# Patient Record
Sex: FEMALE | Race: BLACK OR AFRICAN AMERICAN | NOT HISPANIC OR LATINO | Employment: OTHER | URBAN - METROPOLITAN AREA
[De-identification: names, ages, dates, MRNs, and addresses within clinical notes are randomized per-mention and may not be internally consistent; named-entity substitution may affect disease eponyms.]

---

## 2017-05-04 ENCOUNTER — HOSPITAL ENCOUNTER (OUTPATIENT)
Dept: GENERAL RADIOLOGY | Facility: CLINIC | Age: 37
End: 2017-05-04
Attending: PHYSICIAN ASSISTANT
Payer: COMMERCIAL

## 2017-05-04 ENCOUNTER — HOSPITAL ENCOUNTER (OUTPATIENT)
Dept: GENERAL RADIOLOGY | Facility: CLINIC | Age: 37
Discharge: HOME | End: 2017-05-04
Attending: PHYSICIAN ASSISTANT | Admitting: PHYSICIAN ASSISTANT
Payer: COMMERCIAL

## 2017-05-04 DIAGNOSIS — M25.572 ACUTE LEFT ANKLE PAIN: ICD-10-CM

## 2017-05-04 DIAGNOSIS — M54.50 LOWER BACK PAIN: ICD-10-CM

## 2017-05-04 PROCEDURE — 72100 X-RAY EXAM L-S SPINE 2/3 VWS: CPT

## 2017-05-04 PROCEDURE — 73610 X-RAY EXAM OF ANKLE: CPT | Mod: LT

## 2017-05-29 ENCOUNTER — HOSPITAL ENCOUNTER (EMERGENCY)
Facility: CLINIC | Age: 37
Discharge: HOME | End: 2017-05-29
Attending: EMERGENCY MEDICINE | Admitting: EMERGENCY MEDICINE
Payer: COMMERCIAL

## 2017-05-29 VITALS
DIASTOLIC BLOOD PRESSURE: 65 MMHG | SYSTOLIC BLOOD PRESSURE: 115 MMHG | TEMPERATURE: 97.5 F | HEART RATE: 69 BPM | RESPIRATION RATE: 18 BRPM | BODY MASS INDEX: 34.36 KG/M2 | OXYGEN SATURATION: 98 % | WEIGHT: 189.13 LBS

## 2017-05-29 DIAGNOSIS — R13.19 OTHER DYSPHAGIA: Primary | ICD-10-CM

## 2017-05-29 DIAGNOSIS — R09.A2 SENSATION OF FOREIGN BODY IN ESOPHAGUS: ICD-10-CM

## 2017-05-29 PROCEDURE — 99282 EMERGENCY DEPT VISIT SF MDM: CPT | Performed by: EMERGENCY MEDICINE

## 2017-05-29 PROCEDURE — 99284 EMERGENCY DEPT VISIT MOD MDM: CPT | Mod: Z6 | Performed by: EMERGENCY MEDICINE

## 2017-05-29 ASSESSMENT — ENCOUNTER SYMPTOMS
SORE THROAT: 1
ABDOMINAL PAIN: 0
HEMATURIA: 0
DYSURIA: 0
TROUBLE SWALLOWING: 1
CHILLS: 0
VOMITING: 0
NAUSEA: 0
DIFFICULTY URINATING: 0
FEVER: 0
COUGH: 0
DIARRHEA: 0
SHORTNESS OF BREATH: 0

## 2017-05-29 NOTE — ED PROVIDER NOTES
History     Chief Complaint   Patient presents with     Dysphagia     states that her throat feels tight, difficult when she eats     HPI  Heydi Loza is a 36 year old female who presents for evaluation of dysphagia. The patient comes in with complaint of sore throat and difficulty swallowing over the past 2 weeks. She describes the sensation that something is stuck in her throat and that her throat feels dry, requiring her to clear her throat multiple times throughout the day to feel more comfortable. She states that she feels like she must exert more force in order to get food to move down her throat. The patient states that she correspondingly has been eating softer foods to prevent the sensation of choking. The patient notes that she developed a similar set of symptoms a few months ago after a bout of flu that eventually resolved; though she indicates that the sensation that something is stuck in her throat is new over the last 2 weeks. The patient denies any fevers, cough, runny nose, vomiting, abdominal pain, diarrhea, difficulty urinating or dysuria, or any other concerns or complaints at this time. The patient denies a history of underlying medical problems.    I have reviewed the Medications, Allergies, Past Medical and Surgical History, and Social History in the Epic system.    No current facility-administered medications for this encounter.      No current outpatient prescriptions on file.     Facility-Administered Medications Ordered in Other Encounters   Medication     bupivacaine 0.25 % - EPINEPHrine 1:200,000 injection     Past Medical History:   Diagnosis Date     GBS (group B Streptococcus carrier), +RV culture, currently pregnant 8/10/2011     IUD -- Mirena 10/28/2011    10/10/13 Removed via hysteroscope in office w/o difficulty. Strings NOT visible extending from external cervical os.  US documenting intrauterine IUD obtained 12/20/2011 and 9/13/2013.        Menarche age 18    cycles  28-30 days x 5 days     Postpartum depression 2011     Sprain of ankle, unspecified site 2013     Varicosities     Hemorrhoids       Past Surgical History:   Procedure Laterality Date      SECTION  2011    Procedure: SECTION; Surgeon:ANDRES LU; Location:[facility] L+D      SECTION N/A 2015    Procedure:  SECTION;  Surgeon: Shalonda Rosales MD;  Location: [facility] L+[facility]       Family History   Problem Relation Age of Onset     Family History Negative No family hx of        Social History   Substance Use Topics     Smoking status: Never Smoker     Smokeless tobacco: Never Used     Alcohol use No        Allergies   Allergen Reactions     Nkda [No Known Drug Allergies]        Review of Systems   Constitutional: Negative for chills and fever.   HENT: Positive for sore throat (see HPI) and trouble swallowing (see HPI).    Respiratory: Negative for cough and shortness of breath.    Cardiovascular: Negative for chest pain.   Gastrointestinal: Negative for abdominal pain, diarrhea, nausea and vomiting.   Genitourinary: Negative for difficulty urinating, dysuria and hematuria.   Skin: Negative for rash.   All other systems reviewed and are negative.      Physical Exam   BP: 110/67  Pulse: 69  Temp: 97.3  F (36.3  C)  Resp: 16  Weight: 85.8 kg (189 lb 2 oz)  SpO2: 98 %  Physical Exam    GEN:  Alert, well developed, no acute distress  HEENT:  PERRL, EOMI, Mucous membranes are moist. Posterior pharynx is normal and without swelling, erythema or exudate. Uvula is midline  Cardio:  RRR, no murmur, radial pulses equal bilaterally  PULM:  Lungs clear, good air movement, no wheezes, rales   Abd:  Soft, normal bowel sounds, no focal tenderness  Back exam:  No CVA tenderness  Musculoskeletal:  normal range of motion, no lower extremity swelling or calf tenderness  Neuro:  Alert and oriented X3, Follows commands, moving all extremities spontaneously   Skin:  Warm, dry   ED Course      ED Course     10:48 AM  The patient was seen and examined by Senia Monique MD, in Room 07.     Procedures           Critical Care time:  none   The patient was discussed with the GI fellow. Given that she s had symptoms for 2 weeks, it s less likely that she would have an esophageal food impaction; however, she may have an esophageal stricture. The GI fellow indicated that she would have the endoscopy clinic call the patient this week to schedule an endoscopy for this week. I talked with the patient and offered her a CT scan of the chest and neck area to evaluate for possible mass that could be causing these symptoms. I did explain that there is radiation exposure associated with CT scan. The patient is choosing to wait for the endoscopy and start the workup that way.    Labs Ordered and Resulted from Time of ED Arrival Up to the Time of Departure from the ED - No data to display         Assessments & Plan (with Medical Decision Making)   This is a patient with a sensation of something stuck in the esophagus. At this point she is able to swallow both solids and liquid foods and is not having any vomiting, so I think she could have further workup as an outpatient. She is advised to return if she has worsening symptoms and otherwise to follow up with GI for endoscopy this week.    I have reviewed the nursing notes.    I have reviewed the findings, diagnosis, plan and need for follow up with the patient.    New Prescriptions    No medications on file       Final diagnoses:   Sensation of foreign body in esophagus     IKuldip, am serving as a trained medical scribe to document services personally performed by Senia Monique MD, based on the provider's statements to me.      Senia POLLACK MD, was physically present and have reviewed and verified the accuracy of this note documented by Kuldip Gant.    5/29/2017   [facility] EMERGENCY DEPARTMENT     Senia Monique MD  05/29/17  1838

## 2017-05-29 NOTE — ED AVS SNAPSHOT
[facility], Emergency Department    [address]    [place] [address]    Phone:  [phone]    Fax:  [phone]                                       Heydi Loza   MRN: [id number]    Department:  [facility], Emergency Department   Date of Visit:  5/29/2017           After Visit Summary Signature Page     I have received my discharge instructions, and my questions have been answered. I have discussed any challenges I see with this plan with the nurse or doctor.    ..........................................................................................................................................  Patient/Patient Representative Signature      ..........................................................................................................................................  Patient Representative Print Name and Relationship to Patient    ..................................................               ................................................  Date                                            Time    ..........................................................................................................................................  Reviewed by Signature/Title    ...................................................              ..............................................  Date                                                            Time

## 2017-05-29 NOTE — DISCHARGE INSTRUCTIONS
Anatomy of the Digestive System  Food gives the body the energy needed for life. The digestive system breaks food down into basic nutrients that can be used by the body. The digestive tract is a long, muscular tube that extends from the mouth through the stomach and intestines to the anus. As food moves along the digestive tract, it is digested (changed into substances that can be absorbed into the bloodstream). Certain organs (such as the liver, gallbladder, and pancreas) help with this digestion. Parts of food that cannot be digested are turned into stool, which is waste material that is passed out of the body.  Digestive system      The mouth takes in food, breaks it into pieces, and begins the process of digestion.    The esophagus moves food from the mouth to the stomach.    The stomach breaks food down into a liquid mixture.    The liver makes bile that helps digest fat.    The gallbladder stores bile.    The pancreas makes enzymes that help in digestion.    The small intestine digests food further and absorbs nutrients. What is left is passed on to the colon as liquid waste.    The large intestine (colon) absorbs water, salt, and minerals from the waste, forming a solid stool.    The rectum stores stool until a bowel movement occurs.    The anus is the opening where stool leaves the body.    3297-2943 [employer]. [address] [place] [address]. All rights reserved. This information is not intended as a substitute for professional medical care. Always follow your healthcare professional's instructions.      Please make an appointment to follow up with Endoscopy Clinic (phone: [phone]) as soon as possible for endoscopy.  This is also called an EGD.  This is a procedure where a camera is placed into the esophagus to look for a narrowing of the esophagus. The endoscopy clinic will call you this week to schedule this procedure. Call your primary doctor for assistance if needed.

## 2017-05-29 NOTE — ED AVS SNAPSHOT
[facility], Emergency Department    [address]    [facility] [address]    Phone:  [phone]    Fax:  [phone]                                       Heydi Loza   MRN: [id number]    Department:  [facility], Emergency Department   Date of Visit:  5/29/2017           Patient Information     Date Of Birth          1980        Your diagnoses for this visit were:     Sensation of foreign body in esophagus        You were seen by Senia Monique MD.        Discharge Instructions         Anatomy of the Digestive System  Food gives the body the energy needed for life. The digestive system breaks food down into basic nutrients that can be used by the body. The digestive tract is a long, muscular tube that extends from the mouth through the stomach and intestines to the anus. As food moves along the digestive tract, it is digested (changed into substances that can be absorbed into the bloodstream). Certain organs (such as the liver, gallbladder, and pancreas) help with this digestion. Parts of food that cannot be digested are turned into stool, which is waste material that is passed out of the body.  Digestive system      The mouth takes in food, breaks it into pieces, and begins the process of digestion.    The esophagus moves food from the mouth to the stomach.    The stomach breaks food down into a liquid mixture.    The liver makes bile that helps digest fat.    The gallbladder stores bile.    The pancreas makes enzymes that help in digestion.    The small intestine digests food further and absorbs nutrients. What is left is passed on to the colon as liquid waste.    The large intestine (colon) absorbs water, salt, and minerals from the waste, forming a solid stool.    The rectum stores stool until a bowel movement occurs.    The anus is the opening where stool leaves the body.    4585-5799 [employer]. [address] [place] [address], [place] [address]. All rights reserved.  This information is not intended as a substitute for professional medical care. Always follow your healthcare professional's instructions.      Please make an appointment to follow up with Endoscopy Clinic (phone: [phone]) as soon as possible for endoscopy.  This is also called an EGD.  This is a procedure where a camera is placed into the esophagus to look for a narrowing of the esophagus. The endoscopy clinic will call you this week to schedule this procedure. Call your primary doctor for assistance if needed.       24 Hour Appointment Hotline       To make an appointment at any [facility], call [phone] ([phone]). If you don't have a family doctor or clinic, we will help you find one. [facility] clinics are conveniently located to serve the needs of you and your family.             Review of your medicines      Notice     You have not been prescribed any medications.            Orders Needing Specimen Collection     None      Pending Results     No orders found from 5/27/2017 to 5/30/2017.            Pending Culture Results     No orders found from 5/27/2017 to 5/30/2017.            Pending Results Instructions     If you had any lab results that were not finalized at the time of your Discharge, you can call the ED Lab Result RN at [phone]. You will be contacted by this team for any positive Lab results or changes in treatment. The nurses are available 7 days a week from 10A to 6:30P.  You can leave a message 24 hours per day and they will return your call.        Thank you for choosing [facility]       Thank you for choosing [facility] for your care. Our goal is always to provide you with excellent care. Hearing back from our patients is one way we can continue to improve our services. Please take a few minutes to complete the written survey that you may receive in the mail after you visit with us. Thank you!        [employer] Information     [employer] lets you send messages to your doctor,  "view your test results, renew your prescriptions, schedule appointments and more. To sign up, go to [email or web address] . Click on \"Log in\" on the left side of the screen, which will take you to the Welcome page. Then click on \"Sign up Now\" on the right side of the page.     You will be asked to enter the access code listed below, as well as some personal information. Please follow the directions to create your username and password.     Your access code is: [email or web address]  Expires: 2017 11:33 AM     Your access code will  in 90 days. If you need help or a new code, please call your [place] clinic or [phone].        Care EveryWhere ID     This is your Care EveryWhere ID. This could be used by other organizations to access your [place] medical records  [id number]        After Visit Summary       This is your record. Keep this with you and show to your community pharmacist(s) and doctor(s) at your next visit.                  "

## 2017-06-09 ENCOUNTER — TELEPHONE (OUTPATIENT)
Dept: GASTROENTEROLOGY | Facility: CLINIC | Age: 37
End: 2017-06-09

## 2017-06-22 ENCOUNTER — TELEPHONE (OUTPATIENT)
Dept: GASTROENTEROLOGY | Facility: OUTPATIENT CENTER | Age: 37
End: 2017-06-22

## 2017-06-23 ENCOUNTER — TELEPHONE (OUTPATIENT)
Dept: GASTROENTEROLOGY | Facility: OUTPATIENT CENTER | Age: 37
End: 2017-06-23

## 2017-06-29 ENCOUNTER — DOCUMENTATION ONLY (OUTPATIENT)
Dept: GASTROENTEROLOGY | Facility: OUTPATIENT CENTER | Age: 37
End: 2017-06-29

## 2017-06-30 ENCOUNTER — TRANSFERRED RECORDS (OUTPATIENT)
Dept: HEALTH INFORMATION MANAGEMENT | Facility: CLINIC | Age: 37
End: 2017-06-30

## 2017-07-05 LAB — COPATH REPORT: NORMAL

## 2017-08-01 ENCOUNTER — MEDICAL CORRESPONDENCE (OUTPATIENT)
Dept: HEALTH INFORMATION MANAGEMENT | Facility: CLINIC | Age: 37
End: 2017-08-01

## 2018-07-05 ENCOUNTER — HOSPITAL ENCOUNTER (OUTPATIENT)
Dept: ULTRASOUND IMAGING | Facility: CLINIC | Age: 38
Discharge: HOME | End: 2018-07-05
Attending: ADVANCED PRACTICE MIDWIFE | Admitting: ADVANCED PRACTICE MIDWIFE
Payer: COMMERCIAL

## 2018-07-05 DIAGNOSIS — Z3A.08 8 WEEKS GESTATION OF PREGNANCY: ICD-10-CM

## 2018-07-05 PROCEDURE — 76801 OB US < 14 WKS SINGLE FETUS: CPT

## 2018-09-19 ENCOUNTER — TRANSFERRED RECORDS (OUTPATIENT)
Dept: HEALTH INFORMATION MANAGEMENT | Facility: CLINIC | Age: 38
End: 2018-09-19

## 2018-09-20 ENCOUNTER — PRE VISIT (OUTPATIENT)
Dept: MATERNAL FETAL MEDICINE | Facility: CLINIC | Age: 38
End: 2018-09-20

## 2018-09-24 ENCOUNTER — OFFICE VISIT (OUTPATIENT)
Dept: MATERNAL FETAL MEDICINE | Facility: CLINIC | Age: 38
End: 2018-09-24
Attending: ADVANCED PRACTICE MIDWIFE
Payer: COMMERCIAL

## 2018-09-24 ENCOUNTER — HOSPITAL ENCOUNTER (OUTPATIENT)
Dept: ULTRASOUND IMAGING | Facility: CLINIC | Age: 38
Discharge: HOME | End: 2018-09-24
Attending: ADVANCED PRACTICE MIDWIFE | Admitting: ADVANCED PRACTICE MIDWIFE
Payer: COMMERCIAL

## 2018-09-24 DIAGNOSIS — O26.90 PREGNANCY RELATED CONDITION, ANTEPARTUM: ICD-10-CM

## 2018-09-24 DIAGNOSIS — O09.522 SUPERVISION OF ELDERLY MULTIGRAVIDA IN SECOND TRIMESTER: Primary | ICD-10-CM

## 2018-09-24 PROCEDURE — 96040 ZZH GENETIC COUNSELING, EACH 30 MINUTES: CPT | Mod: ZF | Performed by: GENETIC COUNSELOR, MS

## 2018-09-24 PROCEDURE — 76811 OB US DETAILED SNGL FETUS: CPT

## 2018-09-24 NOTE — PROGRESS NOTES
"Please see \"Imaging\" tab under \"Chart Review\" for details of today's US.      Dian Barrett, DO  Maternal-Fetal Medicine        "

## 2018-09-24 NOTE — MR AVS SNAPSHOT
"              After Visit Summary   9/24/2018    Heydi Loza    MRN: [id number]           Patient Information     Date Of Birth          1980        Visit Information        Provider Department      9/24/2018 8:45 AM Megan Sibley GC [facility] Maternal Fetal Medicine [facility]        Today's Diagnoses     Supervision of elderly multigravida in second trimester    -  1    Pregnancy related condition, antepartum           Follow-ups after your visit        Your next 10 appointments already scheduled     Oct 04, 2018  9:00 AM CDT   New Patient Visit with Tiana Mcmahan MD   [facility] ([facility])    [place] [facility] [address]  [address] [facility] [address] [phone]              Who to contact     If you have questions or need follow up information about today's clinic visit or your schedule please contact [facility] directly at [phone].  Normal or non-critical lab and imaging results will be communicated to you by [employer], letter or phone within 4 business days after the clinic has received the results. If you do not hear from us within 7 days, please contact the clinic through [employer] or phone. If you have a critical or abnormal lab result, we will notify you by phone as soon as possible.  Submit refill requests through [employer] or call your pharmacy and they will forward the refill request to us. Please allow 3 business days for your refill to be completed.          Additional Information About Your Visit        [employer] Information     [employer] lets you send messages to your doctor, view your test results, renew your prescriptions, schedule appointments and more. To sign up, go to [email or web address] . Click on \"Log in\" on the left side of the screen, which will take you to the Welcome page. Then click on \"Sign up Now\" on the right side of the page.     You will be asked to enter " the access code listed below, as well as some personal information. Please follow the directions to create your username and password.     Your access code is: [email or web address]  Expires: 2018  8:51 AM     Your access code will  in 90 days. If you need help or a new code, please call your [facility] or [phone].        Care EveryWhere ID     This is your Care EveryWhere ID. This could be used by other organizations to access your [place] medical records  [id number]        Your Vitals Were     Last Period                   2018            Blood Pressure from Last 3 Encounters:   17 115/65   16 105/74   16 99/62    Weight from Last 3 Encounters:   17 85.8 kg (189 lb 2 oz)   16 91.7 kg (202 lb 1.6 oz)   12/03/15 90.7 kg (200 lb)              We Performed the Following     [facility]        Primary Care Provider Office Phone # Fax #    Caty Lima [phone]       [facility]  [address]  [facility] [address]        Equal Access to Services     [facility] : Hadii aad ku hadasho Soomaali, waaxda luqadaha, qaybta kaalmada adeegyada, cathy rodarte haysaban mary paul ah. So [facility] [phone].    ATENCIÓN: Si habla español, tiene a reyna disposición servicios gratuitos de asistencia lingüística. Llame al [phone].    We comply with applicable federal civil rights laws and [place] laws. We do not discriminate on the basis of race, color, national origin, age, disability, sex, sexual orientation, or gender identity.            Thank you!     Thank you for choosing [facility]  for your care. Our goal is always to provide you with excellent care. Hearing back from our patients is one way we can continue to improve our services. Please take a few minutes to complete the written survey that you may receive in the mail after your visit with us. Thank you!             Your Updated  Medication List - Protect others around you: Learn how to safely use, store and throw away your medicines at www.disposemymeds.org.      Notice  As of 9/24/2018  2:59 PM    You have not been prescribed any medications.

## 2018-09-24 NOTE — MR AVS SNAPSHOT
"              After Visit Summary   9/24/2018    Heydi Loza    MRN: [id number]           Patient Information     Date Of Birth          1980        Visit Information        Provider Department      9/24/2018 10:00 AM Dian Barrett, DO "[facility] Maternal Fetal Medicine  [place]        Today's Diagnoses     Supervision of elderly multigravida in second trimester    -  1       Follow-ups after your visit        Your next 10 appointments already scheduled     Oct 04, 2018  9:00 AM CDT   New Patient Visit with Tiana Mcmahan MD   [facility] ([facility])    [place] [facility] [address]  [address]  [address]  [facility] [address] [phone]              Who to contact     If you have questions or need follow up information about today's clinic visit or your schedule please contact [facility]  [facility] directly at [phone].  Normal or non-critical lab and imaging results will be communicated to you by [employer], letter or phone within 4 business days after the clinic has received the results. If you do not hear from us within 7 days, please contact the clinic through [employer] or phone. If you have a critical or abnormal lab result, we will notify you by phone as soon as possible.  Submit refill requests through [employer] or call your pharmacy and they will forward the refill request to us. Please allow 3 business days for your refill to be completed.          Additional Information About Your Visit        [employer] Information     [employer] lets you send messages to your doctor, view your test results, renew your prescriptions, schedule appointments and more. To sign up, go to [email or web address] . Click on \"Log in\" on the left side of the screen, which will take you to the Welcome page. Then click on \"Sign up Now\" on the right side of the page.     You will be asked to enter the access code listed below, as well as some " personal information. Please follow the directions to create your username and password.     Your access code is: [email or web address]  Expires: 2018  8:51 AM     Your access code will  in 90 days. If you need help or a new code, please call your [place] clinic or [phone].        Care EveryWhere ID     This is your Care EveryWhere ID. This could be used by other organizations to access your [place] medical records  [id number]        Your Vitals Were     Last Period                   2018            Blood Pressure from Last 3 Encounters:   17 115/65   16 105/74   16 99/62    Weight from Last 3 Encounters:   17 85.8 kg (189 lb 2 oz)   16 91.7 kg (202 lb 1.6 oz)   12/03/15 90.7 kg (200 lb)              Today, you had the following     No orders found for display       Primary Care Provider Office Phone # Fax #    Caty BeauchampLEXIE de leon [phone]       [facility]  [address] [facility] [address]        Equal Access to Services     [facility] : Hadii aad ku hadasho Soomaali, waaxda luqadaha, qaybta kaalmada adeegyada, cathy paul . So [facility] [phone].    ATENCIÓN: Si habla español, tiene a reyna disposición servicios gratuitos de asistencia lingüística. Llame al [phone].    We comply with applicable federal civil rights laws and [place] laws. We do not discriminate on the basis of race, color, national origin, age, disability, sex, sexual orientation, or gender identity.            Thank you!     Thank you for choosing [facility]  for your care. Our goal is always to provide you with excellent care. Hearing back from our patients is one way we can continue to improve our services. Please take a few minutes to complete the written survey that you may receive in the mail after your visit with us. Thank you!             Your Updated Medication List - Protect others around  you: Learn how to safely use, store and throw away your medicines at www.disposemymeds.org.      Notice  As of 9/24/2018 10:46 AM    You have not been prescribed any medications.

## 2018-09-24 NOTE — PROGRESS NOTES
[facility] [place]  Genetic Counseling Consult    Patient:  Heydi Loza YOB: 1980   Date of Service:  18      Heydi Loza was seen at the [facility] [place] for genetic consultation as part of her appointment for comprehensive ultrasound.  The indication for genetic counseling is advanced maternal age. This conversation was facilitated by an .       Impression/Plan:   1. Heydi had a quad screen earlier in pregnancy, which was screen negative.    2. Heydi had a comprehensive (level II) ultrasound today.  Please see the ultrasound report for further details.    3. The patient declines genetic amniocentesis and additional maternal serum screening today.    Pregnancy History:   /Parity:    Age at Delivery: 38 year old  EDWARD: 2019, by Last Menstrual Period  Gestational Age: 22w0d    No significant complications or exposures were reported in the current pregnancy.    This pregnancy is with a new partner.    Heydi meza pregnancy history is significant for:  o 2011: 42+0, , female  o 2015: 41+0, , female    Medical History:   Heydi meza reported medical history is not expected to impact pregnancy management or risks to fetal development.       Family History:   A three-generation pedigree was obtained, and is scanned under the  Media  tab.   The reported family history is negative for multiple miscarriages, stillbirths, birth defects, mental retardation, known genetic conditions, and consanguinity.       Risk Assessment for Chromosome Conditions:   We explained that the risk for fetal chromosome abnormalities increases with maternal age. We discussed specific features of common chromosome abnormalities, including Down syndrome, trisomy 13, trisomy 18, and sex chromosome trisomies.      - At age 38 at midtrimester, the risk to have a baby with Down syndrome is 1 in 216.     - At age 38 at  midtrimester, the risk to have a baby with any chromosome abnormality is 1 in 105.       Heydi had maternal serum screening earlier in pregnancy.    Quad screen    Maternal plasma AFP, hCG, estriol, and inhibin measurement between 15-24 weeks gestation    Provides risk assessment for fetal Down syndrome, trisomy 18, and neural tube defects    Heydi had a quad screen earlier in pregnancy; we reviewed the results today, which were Screen Negative    Chemical values were as follows    AFP 1.14 MoM, hCG 1.03 MoM, estriol 1.10 MoM, and DINA 1.45 MoM    This screen gave the following risk assessments:    Down syndrome risk= 1:875    Trisomy 18 risk= less than 1:5000    Open neural tube defects risk= 1:4965       Testing Options:   We discussed the following options:   Non-invasive Prenatal Testing (NIPT)    Maternal plasma cell-free DNA testing; first trimester ultrasound with nuchal translucency and nasal bone assessment is recommended, when appropriate    Screens for fetal trisomy 21, trisomy 13, trisomy 18, and sex chromosome aneuploidy    Cannot screen for open neural tube defects; maternal serum AFP after 15 weeks is recommended     Quad screen:     Maternal plasma AFP, hCG, estriol, and inhibin measurement between 15-24 weeks gestation    Provides risk assessment for fetal Down syndrome, trisomy 18, and neural tube defects     Comprehensive (Level II) ultrasound: Detailed ultrasound performed between 18-22 weeks gestation to screen for major birth defects and markers for aneuploidy.      We reviewed the benefits and limitations of this testing.  Screening tests provide a risk assessment specific to the pregnancy for certain fetal chromosome abnormalities, but cannot definitively diagnose or exclude a fetal chromosome abnormality.  Follow-up genetic counseling and consideration of diagnostic testing is recommended with any abnormal screening result. Diagnostic tests carry inherent risks- including risk of miscarriage-  that require careful consideration.  These tests can detect fetal chromosome abnormalities with greater than 99% certainty.  Results can be compromised by maternal cell contamination or mosaicism, and are limited by the resolution of cytogenetic G-banding technology.  There is no screening nor diagnostic test that can detect all forms of birth defects or mental disability.    It was a pleasure to be involved with Heydi s care. Face-to-face time of the meeting was 30 minutes.    Sherry Sibley MS, [facility]  Maternal Fetal Medicine  [facility]  Ph: [phone]  [email or web address]

## 2018-10-04 ENCOUNTER — OFFICE VISIT (OUTPATIENT)
Dept: OBGYN | Facility: CLINIC | Age: 38
End: 2018-10-04
Attending: OBSTETRICS & GYNECOLOGY
Payer: COMMERCIAL

## 2018-10-04 ENCOUNTER — TELEPHONE (OUTPATIENT)
Dept: OBGYN | Facility: CLINIC | Age: 38
End: 2018-10-04

## 2018-10-04 VITALS
SYSTOLIC BLOOD PRESSURE: 93 MMHG | WEIGHT: 195.8 LBS | DIASTOLIC BLOOD PRESSURE: 59 MMHG | BODY MASS INDEX: 35.57 KG/M2 | HEART RATE: 76 BPM

## 2018-10-04 DIAGNOSIS — O34.219 PREVIOUS CESAREAN DELIVERY, ANTEPARTUM CONDITION OR COMPLICATION: Primary | ICD-10-CM

## 2018-10-04 PROCEDURE — T1013 SIGN LANG/ORAL INTERPRETER: HCPCS | Mod: U3,ZF

## 2018-10-04 RX ORDER — SODIUM CHLORIDE, SODIUM LACTATE, POTASSIUM CHLORIDE, CALCIUM CHLORIDE 600; 310; 30; 20 MG/100ML; MG/100ML; MG/100ML; MG/100ML
INJECTION, SOLUTION INTRAVENOUS CONTINUOUS
Status: CANCELLED | OUTPATIENT
Start: 2018-10-04

## 2018-10-04 RX ORDER — CITRIC ACID/SODIUM CITRATE 334-500MG
30 SOLUTION, ORAL ORAL
Status: CANCELLED | OUTPATIENT
Start: 2018-10-04

## 2018-10-04 RX ORDER — ACETAMINOPHEN 325 MG/1
975 TABLET ORAL ONCE
Status: CANCELLED | OUTPATIENT
Start: 2018-10-04 | End: 2018-10-04

## 2018-10-04 RX ORDER — CEFAZOLIN SODIUM 2 G/50ML
2 SOLUTION INTRAVENOUS
Status: CANCELLED | OUTPATIENT
Start: 2018-10-04

## 2018-10-04 RX ORDER — CEFAZOLIN SODIUM 1 G/3ML
1 INJECTION, POWDER, FOR SOLUTION INTRAMUSCULAR; INTRAVENOUS SEE ADMIN INSTRUCTIONS
Status: CANCELLED | OUTPATIENT
Start: 2018-10-04

## 2018-10-04 NOTE — TELEPHONE ENCOUNTER
Patient in clinic for clinic visit with natali pt was given c/section letter with date, time and location 1/22/19 with arrival time at 8:30a.m with nothing to eat eight hours before scheduled c/section time and clear liquids up to two hours before scheduled c/section time.

## 2018-10-04 NOTE — MR AVS SNAPSHOT
After Visit Summary   10/4/2018    Heydi Loza    MRN: [id number]           Patient Information     Date Of Birth          1980        Visit Information        Provider Department      10/4/2018 8:45 AM Rachel Bernal; Tiana Mcmahan MD [facility]        Today's Diagnoses     Previous  delivery, antepartum condition or complication    -  1       Follow-ups after your visit        Your next 10 appointments already scheduled     2019   Procedure with Tiana Mcmahan MD   [facility] (--)    [address] [facility] [address] [phone]              Who to contact     Please call your clinic at [phone] to:    Ask questions about your health    Make or cancel appointments    Discuss your medicines    Learn about your test results    Speak to your doctor            Additional Information About Your Visit        MyChart Information     [employer] is an electronic gateway that provides easy, online access to your medical records. With [employer], you can request a clinic appointment, read your test results, renew a prescription or communicate with your care team.     To sign up for [employer] visit the website at [email or web address]   You will be asked to enter the access code listed below, as well as some personal information. Please follow the directions to create your username and password.     Your access code is: [email or web address]  Expires: 2018  8:51 AM     Your access code will  in 90 days. If you need help or a new code, please contact your [facility] or call [phone] for assistance.        Care EveryWhere ID     This is your Care EveryWhere ID. This could be used by other organizations to access your [place] medical records  [id number]        Your Vitals Were     Pulse Last Period BMI (Body Mass Index)             76 2018 35.57 kg/m2          Blood Pressure from Last 3  Encounters:   10/04/18 93/59   17 115/65   16 105/74    Weight from Last 3 Encounters:   10/04/18 88.8 kg (195 lb 12.8 oz)   17 85.8 kg (189 lb 2 oz)   16 91.7 kg (202 lb 1.6 oz)              We Performed the Following     Blank-Operative Worksheet (Repeat  Section)        Primary Care Provider Office Phone # Fax Nixon Lima [phone]       [facility]  [address]  [facility] [address]        Equal Access to Services     [facility]: Hadii jorge fitzpatrick hadasho Soivy, waaxda luqadaha, qaybta kaalmada adecristoferyada, cathy paul . So [facility] [phone].    ATENCIÓN: Si habla español, tiene a reyna disposición servicios gratuitos de asistencia lingüística. Llame al [phone].    We comply with applicable federal civil rights laws and [place] laws. We do not discriminate on the basis of race, color, national origin, age, disability, sex, sexual orientation, or gender identity.            Thank you!     Thank you for choosing [facility]  for your care. Our goal is always to provide you with excellent care. Hearing back from our patients is one way we can continue to improve our services. Please take a few minutes to complete the written survey that you may receive in the mail after your visit with us. Thank you!             Your Updated Medication List - Protect others around you: Learn how to safely use, store and throw away your medicines at www.disposemymeds.org.      Notice  As of 10/4/2018 11:59 PM    You have not been prescribed any medications.

## 2018-10-04 NOTE — PROGRESS NOTES
CC/HPI:   Heydi Loza is a 38 year old  at 23+3 weeks who presents today to discuss mode of delivery for this pregnancy.  She has a h/o 2 previous  deliveries-the first in  for arrest of dilation at 5 cm after IOL at 41+5 weeks (baby 8 lbs 13 oz).  The second in 11/15 at 41 weeks secondary to no spontaneous labor and unstable fetal lie.  That procedure was complicated by a bladder dome injury that was repaired intraoperatively by urology and uterine atony for which she received Pitocin, Cytotec, methergine x 2 and Hemabate during the procedure.    The birthweight of that baby was 10 lbs 1.9 oz.  She is hoping for a TOLA2C.      HISTORIES:  Patient Active Problem List   Diagnosis     Adjustment reaction with anxiety and depression     Other sleep disturbances     Vitamin D deficiency <13     Language barrier, cultural differences -- Greenlandic.     Pre-conception counseling     Low back pain     Sprain of ankle     Sinus tarsi syndrome     Pain in joint, ankle and foot     Encounter for triage in pregnant patient     Breech presentation     HRP (high risk pregnancy)     History of  delivery, currently pregnant     S/P  section     Past Medical History:   Diagnosis Date     GBS (group B Streptococcus carrier), +RV culture, currently pregnant 8/10/2011     IUD -- Mirena 10/28/2011    10/10/13 Removed via hysteroscope in office w/o difficulty. Strings NOT visible extending from external cervical os.  US documenting intrauterine IUD obtained 2011 and 2013.        Menarche age 18    cycles 28-30 days x 5 days     Postpartum depression 2011     Sprain of ankle, unspecified site 2013     Varicosities     Hemorrhoids     Past Surgical History:   Procedure Laterality Date      SECTION  2011    Procedure: SECTION; Surgeon:ANDRES LU; Location:[facility] L+[facility]      SECTION N/A 2015    Procedure:  SECTION;  Surgeon:  Shalonda Rosales MD;  Location: [facility] L+[facility]     No current outpatient prescriptions on file.     Allergies   Allergen Reactions     Nkda [No Known Drug Allergies]      Social History     Social History     Marital status: Single     Spouse name: N/A     Number of children: N/A     Years of education: N/A     Occupational History     Not on file.     Social History Main Topics     Smoking status: Never Smoker     Smokeless tobacco: Never Used     Alcohol use No     Drug use: No     Sexual activity: Not on file      Comment: Miren     Other Topics Concern     Not on file     Social History Narrative    Has a 10 month old daughter, is  from , not working     Family History   Problem Relation Age of Onset     Family History Negative No family hx of           Gyn Hx:   Patient's last menstrual period was 2018.  Menses:   NA    Review Of Systems:  CONSTITUTIONAL: NEGATIVE for fever, chills  EYES: NEGATIVE for vision changes   RESP: NEGATIVE for significant cough or SOB  CV: NEGATIVE for chest pain, palpitations   GI: NEGATIVE for nausea, abdominal pain, heartburn, or change in bowel habits  : NEGATIVE for frequency, dysuria, or hematuria  MUSCULOSKELETAL: NEGATIVE for significant arthralgias or myalgia  NEURO: NEGATIVE for weakness, dizziness or paresthesias or headache    EXAM:  BP 93/59  Pulse 76  Wt 88.8 kg (195 lb 12.8 oz)  LMP 2018  BMI 35.57 kg/m2  Body mass index is 35.57 kg/(m^2).    General - pleasant female in no acute distress.    ASSESSMENT    39 y/o  at 23+3 weeks with a h/o 2 previous  deliveries, the last complicated by PPH secondary to fetal macrosomia and bladder dome injury.      Plan    Discussed that she is not a good candidate for a TOLA2C given that she has had no previous vaginal deliveries, the likely andrade that she will not have spontaneous labor before 41 weeks, that this baby will likely be large and most importantly that she had a bladder injury with  her last  section.  Discussed that there would be no way to do a code c/s for fetal distress or suspected uterine rupture without another intraoperative injury.   Discussed that the safest delivery would be a planned repeat c/s at 39 weeks.  She was agreeable to this plan.  Surgery scheduling request sent for repeat c/s with me (Dr. Scott would also be available as she is on call that day) at 39 weeks.        Comment:   Plan: Blank-Operative Worksheet (Repeat          Section), CBC with platelets, ABO/Rh Type and         Screen     Tiana Mcmahan MD, FACOG    Total visit time was 30 minutes with 25 minutes spent in counseling and coordination of care for repeat  section .

## 2018-10-04 NOTE — LETTER
10/4/2018       RE: Heydi Loza  [address]   [place] [address]     Dear Colleague,    Thank you for referring your patient, Heydi Loza, to the [facility] at [facility]. Please see a copy of my visit note below.          CC/HPI:   Heydi Loza is a 38 year old  at 23+3 weeks who presents today to discuss mode of delivery for this pregnancy.  She has a h/o 2 previous  deliveries-the first in  for arrest of dilation at 5 cm after IOL at 41+5 weeks (baby 8 lbs 13 oz).  The second in 11/15 at 41 weeks secondary to no spontaneous labor and unstable fetal lie.  That procedure was complicated by a bladder dome injury that was repaired intraoperatively by urology and uterine atony for which she received Pitocin, Cytotec, methergine x 2 and Hemabate during the procedure.    The birthweight of that baby was 10 lbs 1.9 oz.  She is hoping for a TOLA2C.      HISTORIES:  Patient Active Problem List   Diagnosis     Adjustment reaction with anxiety and depression     Other sleep disturbances     Vitamin D deficiency <13     Language barrier, cultural differences -- Chinese.     Pre-conception counseling     Low back pain     Sprain of ankle     Sinus tarsi syndrome     Pain in joint, ankle and foot     Encounter for triage in pregnant patient     Breech presentation     HRP (high risk pregnancy)     History of  delivery, currently pregnant     S/P  section     Past Medical History:   Diagnosis Date     GBS (group B Streptococcus carrier), +RV culture, currently pregnant 8/10/2011     IUD -- Mirena 10/28/2011    10/10/13 Removed via hysteroscope in office w/o difficulty. Strings NOT visible extending from external cervical os.  US documenting intrauterine IUD obtained 2011 and 2013.        Menarche age 18    cycles 28-30 days x 5 days     Postpartum depression 2011     Sprain of  ankle, unspecified site 2013     Varicosities     Hemorrhoids     Past Surgical History:   Procedure Laterality Date      SECTION  2011    Procedure: SECTION; Surgeon:ANDRES LU; Location:[facility] L[facility]      SECTION N/A 2015    Procedure:  SECTION;  Surgeon: Shalonda Rsoales MD;  Location: [facility] L[facility]     No current outpatient prescriptions on file.     Allergies   Allergen Reactions     Nkda [No Known Drug Allergies]      Social History     Social History     Marital status: Single     Spouse name: N/A     Number of children: N/A     Years of education: N/A     Occupational History     Not on file.     Social History Main Topics     Smoking status: Never Smoker     Smokeless tobacco: Never Used     Alcohol use No     Drug use: No     Sexual activity: Not on file      Comment: Jw     Other Topics Concern     Not on file     Social History Narrative    Has a 10 month old daughter, is  from , not working     Family History   Problem Relation Age of Onset     Family History Negative No family hx of           Gyn Hx:   Patient's last menstrual period was 2018.  Menses:   NA    EXAM:  BP 93/59  Pulse 76  Wt 88.8 kg (195 lb 12.8 oz)  LMP 2018  BMI 35.57 kg/m2  Body mass index is 35.57 kg/(m^2).    General - pleasant female in no acute distress.    ASSESSMENT    37 y/o  at 23+3 weeks with a h/o 2 previous  deliveries, the last complicated by PPH secondary to fetal macrosomia and bladder dome injury.      Plan    Discussed that she is not a good candidate for a TOLA2C given that she has had no previous vaginal deliveries, the likely andrade that she will not have spontaneous labor before 41 weeks, that this baby will likely be large and most importantly that she had a bladder injury with her last  section.  Discussed that there would be no way to do a code c/s for fetal distress or suspected uterine rupture without  another intraoperative injury.   Discussed that the safest delivery would be a planned repeat c/s at 39 weeks.  She was agreeable to this plan.  Surgery scheduling request sent for repeat c/s with me (Dr. Scott would also be available as she is on call that day) at 39 weeks.        Comment:   Plan: Blank-Operative Worksheet (Repeat          Section), CBC with platelets, ABO/Rh Type and         Screen     Tiana Mcmahan MD, FACOG    Total visit time was 30 minutes with 25 minutes spent in counseling and coordination of care for repeat  section .

## 2018-10-04 NOTE — LETTER
2018        Heydi Loza  [address]   [place] [address]    To Whom it May Concern:    Heydi Loza was seen in our office on 10/4/2018.  She is 23+3 weeks pregnant and will behaving her third repeat  section on 19.  Please allow her , Brandy Patricia,  1977 to get a visa to come assist her with recovery and  after her surgery.      Sincerely,        Tiana Mcmahan MD

## 2019-01-21 ENCOUNTER — ANESTHESIA EVENT (OUTPATIENT)
Dept: OBGYN | Facility: CLINIC | Age: 39
End: 2019-01-21
Payer: COMMERCIAL

## 2019-01-21 DIAGNOSIS — O34.219 PREVIOUS CESAREAN DELIVERY, ANTEPARTUM CONDITION OR COMPLICATION: ICD-10-CM

## 2019-01-21 LAB
ABO + RH BLD: NORMAL
ABO + RH BLD: NORMAL
BLD GP AB SCN SERPL QL: NORMAL
BLOOD BANK CMNT PATIENT-IMP: NORMAL
ERYTHROCYTE [DISTWIDTH] IN BLOOD BY AUTOMATED COUNT: 19.6 % (ref 10–15)
HCT VFR BLD AUTO: 39.6 % (ref 35–47)
HGB BLD-MCNC: 12.3 G/DL (ref 11.7–15.7)
MCH RBC QN AUTO: 27 PG (ref 26.5–33)
MCHC RBC AUTO-ENTMCNC: 31.1 G/DL (ref 31.5–36.5)
MCV RBC AUTO: 87 FL (ref 78–100)
PLATELET # BLD AUTO: 184 10E9/L (ref 150–450)
RBC # BLD AUTO: 4.56 10E12/L (ref 3.8–5.2)
SPECIMEN EXP DATE BLD: NORMAL
WBC # BLD AUTO: 9.2 10E9/L (ref 4–11)

## 2019-01-21 PROCEDURE — 85027 COMPLETE CBC AUTOMATED: CPT | Performed by: OBSTETRICS & GYNECOLOGY

## 2019-01-21 PROCEDURE — 86900 BLOOD TYPING SEROLOGIC ABO: CPT | Performed by: OBSTETRICS & GYNECOLOGY

## 2019-01-21 PROCEDURE — 36415 COLL VENOUS BLD VENIPUNCTURE: CPT | Performed by: OBSTETRICS & GYNECOLOGY

## 2019-01-21 PROCEDURE — 86901 BLOOD TYPING SEROLOGIC RH(D): CPT | Performed by: OBSTETRICS & GYNECOLOGY

## 2019-01-21 PROCEDURE — 86850 RBC ANTIBODY SCREEN: CPT | Performed by: OBSTETRICS & GYNECOLOGY

## 2019-01-22 ENCOUNTER — ANESTHESIA (OUTPATIENT)
Dept: OBGYN | Facility: CLINIC | Age: 39
End: 2019-01-22
Payer: COMMERCIAL

## 2019-01-22 ENCOUNTER — HOSPITAL ENCOUNTER (INPATIENT)
Facility: CLINIC | Age: 39
LOS: 3 days | Discharge: HOME HEALTH | End: 2019-01-25
Attending: OBSTETRICS & GYNECOLOGY | Admitting: OBSTETRICS & GYNECOLOGY
Payer: COMMERCIAL

## 2019-01-22 DIAGNOSIS — Z98.891 S/P C-SECTION: Primary | ICD-10-CM

## 2019-01-22 LAB
CREAT SERPL-MCNC: 0.43 MG/DL (ref 0.52–1.04)
GFR SERPL CREATININE-BSD FRML MDRD: >90 ML/MIN/{1.73_M2}
PLATELET # BLD AUTO: 172 10E9/L (ref 150–450)
T PALLIDUM AB SER QL: NONREACTIVE

## 2019-01-22 PROCEDURE — 36415 COLL VENOUS BLD VENIPUNCTURE: CPT | Performed by: STUDENT IN AN ORGANIZED HEALTH CARE EDUCATION/TRAINING PROGRAM

## 2019-01-22 PROCEDURE — 37000009 ZZH ANESTHESIA TECHNICAL FEE, EACH ADDTL 15 MIN: Performed by: OBSTETRICS & GYNECOLOGY

## 2019-01-22 PROCEDURE — 86780 TREPONEMA PALLIDUM: CPT | Performed by: OBSTETRICS & GYNECOLOGY

## 2019-01-22 PROCEDURE — 36000059 ZZH SURGERY LEVEL 3 EA 15 ADDTL MIN UMMC: Performed by: OBSTETRICS & GYNECOLOGY

## 2019-01-22 PROCEDURE — 25000128 H RX IP 250 OP 636: Performed by: STUDENT IN AN ORGANIZED HEALTH CARE EDUCATION/TRAINING PROGRAM

## 2019-01-22 PROCEDURE — 25000125 ZZHC RX 250: Performed by: STUDENT IN AN ORGANIZED HEALTH CARE EDUCATION/TRAINING PROGRAM

## 2019-01-22 PROCEDURE — 12000001 ZZH R&B MED SURG/OB UMMC

## 2019-01-22 PROCEDURE — 36000057 ZZH SURGERY LEVEL 3 1ST 30 MIN - UMMC: Performed by: OBSTETRICS & GYNECOLOGY

## 2019-01-22 PROCEDURE — 71000015 ZZH RECOVERY PHASE 1 LEVEL 2 EA ADDTL HR: Performed by: OBSTETRICS & GYNECOLOGY

## 2019-01-22 PROCEDURE — C9290 INJ, BUPIVACAINE LIPOSOME: HCPCS | Performed by: STUDENT IN AN ORGANIZED HEALTH CARE EDUCATION/TRAINING PROGRAM

## 2019-01-22 PROCEDURE — 25000132 ZZH RX MED GY IP 250 OP 250 PS 637: Performed by: STUDENT IN AN ORGANIZED HEALTH CARE EDUCATION/TRAINING PROGRAM

## 2019-01-22 PROCEDURE — 40000170 ZZH STATISTIC PRE-PROCEDURE ASSESSMENT II: Performed by: OBSTETRICS & GYNECOLOGY

## 2019-01-22 PROCEDURE — 82565 ASSAY OF CREATININE: CPT | Performed by: STUDENT IN AN ORGANIZED HEALTH CARE EDUCATION/TRAINING PROGRAM

## 2019-01-22 PROCEDURE — 27210794 ZZH OR GENERAL SUPPLY STERILE: Performed by: OBSTETRICS & GYNECOLOGY

## 2019-01-22 PROCEDURE — 71000014 ZZH RECOVERY PHASE 1 LEVEL 2 FIRST HR: Performed by: OBSTETRICS & GYNECOLOGY

## 2019-01-22 PROCEDURE — 37000008 ZZH ANESTHESIA TECHNICAL FEE, 1ST 30 MIN: Performed by: OBSTETRICS & GYNECOLOGY

## 2019-01-22 PROCEDURE — 85049 AUTOMATED PLATELET COUNT: CPT | Performed by: STUDENT IN AN ORGANIZED HEALTH CARE EDUCATION/TRAINING PROGRAM

## 2019-01-22 RX ORDER — ACETAMINOPHEN 325 MG/1
650 TABLET ORAL EVERY 6 HOURS PRN
Status: DISCONTINUED | OUTPATIENT
Start: 2019-01-22 | End: 2019-01-25 | Stop reason: HOSPADM

## 2019-01-22 RX ORDER — ONDANSETRON 2 MG/ML
4 INJECTION INTRAMUSCULAR; INTRAVENOUS EVERY 30 MIN PRN
Status: DISCONTINUED | OUTPATIENT
Start: 2019-01-22 | End: 2019-01-22 | Stop reason: HOSPADM

## 2019-01-22 RX ORDER — MORPHINE SULFATE 1 MG/ML
150 INJECTION, SOLUTION EPIDURAL; INTRATHECAL; INTRAVENOUS ONCE
Status: COMPLETED | OUTPATIENT
Start: 2019-01-22 | End: 2019-01-22

## 2019-01-22 RX ORDER — BISACODYL 10 MG
10 SUPPOSITORY, RECTAL RECTAL DAILY PRN
Status: DISCONTINUED | OUTPATIENT
Start: 2019-01-24 | End: 2019-01-25 | Stop reason: HOSPADM

## 2019-01-22 RX ORDER — ONDANSETRON 4 MG/1
4 TABLET, ORALLY DISINTEGRATING ORAL EVERY 30 MIN PRN
Status: DISCONTINUED | OUTPATIENT
Start: 2019-01-22 | End: 2019-01-22 | Stop reason: HOSPADM

## 2019-01-22 RX ORDER — IBUPROFEN 800 MG/1
800 TABLET, FILM COATED ORAL EVERY 6 HOURS PRN
Status: DISCONTINUED | OUTPATIENT
Start: 2019-01-23 | End: 2019-01-25 | Stop reason: HOSPADM

## 2019-01-22 RX ORDER — IBUPROFEN 800 MG/1
800 TABLET, FILM COATED ORAL EVERY 6 HOURS PRN
Status: DISCONTINUED | OUTPATIENT
Start: 2019-01-22 | End: 2019-01-22

## 2019-01-22 RX ORDER — FENTANYL CITRATE 50 UG/ML
15 INJECTION, SOLUTION INTRAMUSCULAR; INTRAVENOUS ONCE
Status: COMPLETED | OUTPATIENT
Start: 2019-01-22 | End: 2019-01-22

## 2019-01-22 RX ORDER — NALOXONE HYDROCHLORIDE 0.4 MG/ML
.1-.4 INJECTION, SOLUTION INTRAMUSCULAR; INTRAVENOUS; SUBCUTANEOUS
Status: DISCONTINUED | OUTPATIENT
Start: 2019-01-22 | End: 2019-01-22

## 2019-01-22 RX ORDER — CITRIC ACID/SODIUM CITRATE 334-500MG
30 SOLUTION, ORAL ORAL
Status: COMPLETED | OUTPATIENT
Start: 2019-01-22 | End: 2019-01-22

## 2019-01-22 RX ORDER — OXYTOCIN/0.9 % SODIUM CHLORIDE 30/500 ML
100 PLASTIC BAG, INJECTION (ML) INTRAVENOUS CONTINUOUS
Status: DISCONTINUED | OUTPATIENT
Start: 2019-01-22 | End: 2019-01-25 | Stop reason: HOSPADM

## 2019-01-22 RX ORDER — LABETALOL HYDROCHLORIDE 5 MG/ML
10 INJECTION, SOLUTION INTRAVENOUS
Status: DISCONTINUED | OUTPATIENT
Start: 2019-01-22 | End: 2019-01-22 | Stop reason: HOSPADM

## 2019-01-22 RX ORDER — LANOLIN 100 %
OINTMENT (GRAM) TOPICAL
Status: DISCONTINUED | OUTPATIENT
Start: 2019-01-22 | End: 2019-01-25 | Stop reason: HOSPADM

## 2019-01-22 RX ORDER — KETOROLAC TROMETHAMINE 30 MG/ML
30 INJECTION, SOLUTION INTRAMUSCULAR; INTRAVENOUS EVERY 6 HOURS
Status: COMPLETED | OUTPATIENT
Start: 2019-01-22 | End: 2019-01-23

## 2019-01-22 RX ORDER — CEFAZOLIN SODIUM 2 G/100ML
2 INJECTION, SOLUTION INTRAVENOUS
Status: COMPLETED | OUTPATIENT
Start: 2019-01-22 | End: 2019-01-22

## 2019-01-22 RX ORDER — SIMETHICONE 80 MG
80 TABLET,CHEWABLE ORAL 4 TIMES DAILY PRN
Status: DISCONTINUED | OUTPATIENT
Start: 2019-01-22 | End: 2019-01-25 | Stop reason: HOSPADM

## 2019-01-22 RX ORDER — SODIUM CHLORIDE, SODIUM LACTATE, POTASSIUM CHLORIDE, CALCIUM CHLORIDE 600; 310; 30; 20 MG/100ML; MG/100ML; MG/100ML; MG/100ML
INJECTION, SOLUTION INTRAVENOUS CONTINUOUS
Status: DISCONTINUED | OUTPATIENT
Start: 2019-01-22 | End: 2019-01-22 | Stop reason: HOSPADM

## 2019-01-22 RX ORDER — BUPIVACAINE HYDROCHLORIDE 7.5 MG/ML
1.6 INJECTION, SOLUTION EPIDURAL; RETROBULBAR ONCE
Status: COMPLETED | OUTPATIENT
Start: 2019-01-22 | End: 2019-01-22

## 2019-01-22 RX ORDER — AMOXICILLIN 250 MG
1 CAPSULE ORAL 2 TIMES DAILY PRN
Status: DISCONTINUED | OUTPATIENT
Start: 2019-01-22 | End: 2019-01-25 | Stop reason: HOSPADM

## 2019-01-22 RX ORDER — MULTIVIT-MIN/IRON/FOLIC ACID/K 18-600-40
CAPSULE ORAL
COMMUNITY
End: 2019-08-12

## 2019-01-22 RX ORDER — MORPHINE SULFATE 1 MG/ML
INJECTION, SOLUTION EPIDURAL; INTRATHECAL; INTRAVENOUS
Status: DISCONTINUED
Start: 2019-01-22 | End: 2019-01-22 | Stop reason: HOSPADM

## 2019-01-22 RX ORDER — OXYTOCIN/0.9 % SODIUM CHLORIDE 30/500 ML
340 PLASTIC BAG, INJECTION (ML) INTRAVENOUS CONTINUOUS PRN
Status: DISCONTINUED | OUTPATIENT
Start: 2019-01-22 | End: 2019-01-25 | Stop reason: HOSPADM

## 2019-01-22 RX ORDER — DIPHENHYDRAMINE HCL 25 MG
25 CAPSULE ORAL EVERY 6 HOURS PRN
Status: DISCONTINUED | OUTPATIENT
Start: 2019-01-22 | End: 2019-01-25 | Stop reason: HOSPADM

## 2019-01-22 RX ORDER — EPHEDRINE SULFATE 50 MG/ML
5 INJECTION, SOLUTION INTRAMUSCULAR; INTRAVENOUS; SUBCUTANEOUS
Status: DISCONTINUED | OUTPATIENT
Start: 2019-01-22 | End: 2019-01-22

## 2019-01-22 RX ORDER — OXYTOCIN/0.9 % SODIUM CHLORIDE 30/500 ML
PLASTIC BAG, INJECTION (ML) INTRAVENOUS CONTINUOUS PRN
Status: DISCONTINUED | OUTPATIENT
Start: 2019-01-22 | End: 2019-01-22

## 2019-01-22 RX ORDER — HYDROMORPHONE HYDROCHLORIDE 1 MG/ML
.3-.5 INJECTION, SOLUTION INTRAMUSCULAR; INTRAVENOUS; SUBCUTANEOUS EVERY 5 MIN PRN
Status: DISCONTINUED | OUTPATIENT
Start: 2019-01-22 | End: 2019-01-22 | Stop reason: HOSPADM

## 2019-01-22 RX ORDER — EPHEDRINE SULFATE 50 MG/ML
INJECTION, SOLUTION INTRAMUSCULAR; INTRAVENOUS; SUBCUTANEOUS PRN
Status: DISCONTINUED | OUTPATIENT
Start: 2019-01-22 | End: 2019-01-22

## 2019-01-22 RX ORDER — AMOXICILLIN 250 MG
2 CAPSULE ORAL 2 TIMES DAILY PRN
Status: DISCONTINUED | OUTPATIENT
Start: 2019-01-22 | End: 2019-01-25 | Stop reason: HOSPADM

## 2019-01-22 RX ORDER — NALOXONE HYDROCHLORIDE 0.4 MG/ML
.1-.4 INJECTION, SOLUTION INTRAMUSCULAR; INTRAVENOUS; SUBCUTANEOUS
Status: DISCONTINUED | OUTPATIENT
Start: 2019-01-22 | End: 2019-01-25 | Stop reason: HOSPADM

## 2019-01-22 RX ORDER — LIDOCAINE 40 MG/G
CREAM TOPICAL
Status: DISCONTINUED | OUTPATIENT
Start: 2019-01-22 | End: 2019-01-22

## 2019-01-22 RX ORDER — OXYCODONE HYDROCHLORIDE 5 MG/1
5-10 TABLET ORAL EVERY 4 HOURS PRN
Status: DISCONTINUED | OUTPATIENT
Start: 2019-01-22 | End: 2019-01-25 | Stop reason: HOSPADM

## 2019-01-22 RX ORDER — CEFAZOLIN SODIUM 1 G/3ML
1 INJECTION, POWDER, FOR SOLUTION INTRAMUSCULAR; INTRAVENOUS SEE ADMIN INSTRUCTIONS
Status: DISCONTINUED | OUTPATIENT
Start: 2019-01-22 | End: 2019-01-22 | Stop reason: HOSPADM

## 2019-01-22 RX ORDER — LIDOCAINE 40 MG/G
CREAM TOPICAL
Status: DISCONTINUED | OUTPATIENT
Start: 2019-01-22 | End: 2019-01-25 | Stop reason: HOSPADM

## 2019-01-22 RX ORDER — HYDROCORTISONE 2.5 %
CREAM (GRAM) TOPICAL 3 TIMES DAILY PRN
Status: DISCONTINUED | OUTPATIENT
Start: 2019-01-22 | End: 2019-01-25 | Stop reason: HOSPADM

## 2019-01-22 RX ORDER — BUPIVACAINE HYDROCHLORIDE 2.5 MG/ML
INJECTION, SOLUTION EPIDURAL; INFILTRATION; INTRACAUDAL PRN
Status: DISCONTINUED | OUTPATIENT
Start: 2019-01-22 | End: 2019-01-22

## 2019-01-22 RX ORDER — DEXTROSE, SODIUM CHLORIDE, SODIUM LACTATE, POTASSIUM CHLORIDE, AND CALCIUM CHLORIDE 5; .6; .31; .03; .02 G/100ML; G/100ML; G/100ML; G/100ML; G/100ML
INJECTION, SOLUTION INTRAVENOUS CONTINUOUS
Status: DISCONTINUED | OUTPATIENT
Start: 2019-01-22 | End: 2019-01-25 | Stop reason: HOSPADM

## 2019-01-22 RX ORDER — PRENATAL VIT/IRON FUM/FOLIC AC 27MG-0.8MG
1 TABLET ORAL DAILY
COMMUNITY

## 2019-01-22 RX ORDER — ONDANSETRON 2 MG/ML
4 INJECTION INTRAMUSCULAR; INTRAVENOUS EVERY 6 HOURS PRN
Status: DISCONTINUED | OUTPATIENT
Start: 2019-01-22 | End: 2019-01-25 | Stop reason: HOSPADM

## 2019-01-22 RX ORDER — ONDANSETRON 2 MG/ML
INJECTION INTRAMUSCULAR; INTRAVENOUS PRN
Status: DISCONTINUED | OUTPATIENT
Start: 2019-01-22 | End: 2019-01-22

## 2019-01-22 RX ORDER — NALBUPHINE HYDROCHLORIDE 10 MG/ML
2.5-5 INJECTION, SOLUTION INTRAMUSCULAR; INTRAVENOUS; SUBCUTANEOUS EVERY 6 HOURS PRN
Status: DISCONTINUED | OUTPATIENT
Start: 2019-01-22 | End: 2019-01-22

## 2019-01-22 RX ORDER — OXYTOCIN 10 [USP'U]/ML
10 INJECTION, SOLUTION INTRAMUSCULAR; INTRAVENOUS
Status: DISCONTINUED | OUTPATIENT
Start: 2019-01-22 | End: 2019-01-25 | Stop reason: HOSPADM

## 2019-01-22 RX ORDER — SODIUM CHLORIDE, SODIUM LACTATE, POTASSIUM CHLORIDE, CALCIUM CHLORIDE 600; 310; 30; 20 MG/100ML; MG/100ML; MG/100ML; MG/100ML
INJECTION, SOLUTION INTRAVENOUS CONTINUOUS PRN
Status: DISCONTINUED | OUTPATIENT
Start: 2019-01-22 | End: 2019-01-22

## 2019-01-22 RX ORDER — PHENYLEPHRINE HCL IN 0.9% NACL 1 MG/10 ML
SYRINGE (ML) INTRAVENOUS
Status: DISCONTINUED
Start: 2019-01-22 | End: 2019-01-22 | Stop reason: HOSPADM

## 2019-01-22 RX ORDER — FLUMAZENIL 0.1 MG/ML
0.2 INJECTION, SOLUTION INTRAVENOUS
Status: DISCONTINUED | OUTPATIENT
Start: 2019-01-22 | End: 2019-01-22

## 2019-01-22 RX ORDER — FENTANYL CITRATE 50 UG/ML
25-50 INJECTION, SOLUTION INTRAMUSCULAR; INTRAVENOUS
Status: DISCONTINUED | OUTPATIENT
Start: 2019-01-22 | End: 2019-01-22 | Stop reason: HOSPADM

## 2019-01-22 RX ORDER — KETOROLAC TROMETHAMINE 30 MG/ML
INJECTION, SOLUTION INTRAMUSCULAR; INTRAVENOUS PRN
Status: DISCONTINUED | OUTPATIENT
Start: 2019-01-22 | End: 2019-01-22

## 2019-01-22 RX ADMIN — SODIUM CHLORIDE, POTASSIUM CHLORIDE, SODIUM LACTATE AND CALCIUM CHLORIDE: 600; 310; 30; 20 INJECTION, SOLUTION INTRAVENOUS at 11:39

## 2019-01-22 RX ADMIN — BUPIVACAINE 20 ML: 13.3 INJECTION, SUSPENSION, LIPOSOMAL INFILTRATION at 12:34

## 2019-01-22 RX ADMIN — SODIUM CHLORIDE, POTASSIUM CHLORIDE, SODIUM LACTATE AND CALCIUM CHLORIDE: 600; 310; 30; 20 INJECTION, SOLUTION INTRAVENOUS at 10:40

## 2019-01-22 RX ADMIN — SODIUM CITRATE AND CITRIC ACID MONOHYDRATE 30 ML: 500; 334 SOLUTION ORAL at 10:12

## 2019-01-22 RX ADMIN — DIPHENHYDRAMINE HYDROCHLORIDE 25 MG: 25 CAPSULE ORAL at 22:36

## 2019-01-22 RX ADMIN — OXYTOCIN-SODIUM CHLORIDE 0.9% IV SOLN 30 UNIT/500ML 100 ML/HR: 30-0.9/5 SOLUTION at 15:00

## 2019-01-22 RX ADMIN — PHENYLEPHRINE HYDROCHLORIDE 100 MCG: 10 INJECTION, SOLUTION INTRAMUSCULAR; INTRAVENOUS; SUBCUTANEOUS at 12:10

## 2019-01-22 RX ADMIN — KETOROLAC TROMETHAMINE 30 MG: 30 INJECTION, SOLUTION INTRAMUSCULAR at 17:47

## 2019-01-22 RX ADMIN — ACETAMINOPHEN 650 MG: 325 TABLET, FILM COATED ORAL at 17:47

## 2019-01-22 RX ADMIN — Medication 5 MG: at 11:37

## 2019-01-22 RX ADMIN — OXYTOCIN-SODIUM CHLORIDE 0.9% IV SOLN 30 UNIT/500ML 300 ML/HR: 30-0.9/5 SOLUTION at 11:31

## 2019-01-22 RX ADMIN — SODIUM CHLORIDE, SODIUM LACTATE, POTASSIUM CHLORIDE, CALCIUM CHLORIDE AND DEXTROSE MONOHYDRATE: 5; 600; 310; 30; 20 INJECTION, SOLUTION INTRAVENOUS at 19:54

## 2019-01-22 RX ADMIN — FENTANYL CITRATE 10 MCG: 50 INJECTION, SOLUTION INTRAMUSCULAR; INTRAVENOUS at 11:01

## 2019-01-22 RX ADMIN — Medication 5 MG: at 11:49

## 2019-01-22 RX ADMIN — SODIUM CHLORIDE, POTASSIUM CHLORIDE, SODIUM LACTATE AND CALCIUM CHLORIDE 1000 ML: 600; 310; 30; 20 INJECTION, SOLUTION INTRAVENOUS at 09:31

## 2019-01-22 RX ADMIN — Medication 10 MG: at 12:00

## 2019-01-22 RX ADMIN — SENNOSIDES AND DOCUSATE SODIUM 1 TABLET: 8.6; 5 TABLET ORAL at 19:54

## 2019-01-22 RX ADMIN — CEFAZOLIN SODIUM 2 G: 2 INJECTION, SOLUTION INTRAVENOUS at 11:05

## 2019-01-22 RX ADMIN — SIMETHICONE CHEW TAB 80 MG 80 MG: 80 TABLET ORAL at 22:36

## 2019-01-22 RX ADMIN — SODIUM CHLORIDE, POTASSIUM CHLORIDE, SODIUM LACTATE AND CALCIUM CHLORIDE: 600; 310; 30; 20 INJECTION, SOLUTION INTRAVENOUS at 10:14

## 2019-01-22 RX ADMIN — PHENYLEPHRINE HYDROCHLORIDE 100 MCG: 10 INJECTION, SOLUTION INTRAMUSCULAR; INTRAVENOUS; SUBCUTANEOUS at 11:02

## 2019-01-22 RX ADMIN — PHENYLEPHRINE HYDROCHLORIDE 100 MCG: 10 INJECTION, SOLUTION INTRAMUSCULAR; INTRAVENOUS; SUBCUTANEOUS at 12:00

## 2019-01-22 RX ADMIN — PHENYLEPHRINE HYDROCHLORIDE 200 MCG: 10 INJECTION, SOLUTION INTRAMUSCULAR; INTRAVENOUS; SUBCUTANEOUS at 11:36

## 2019-01-22 RX ADMIN — BUPIVACAINE HYDROCHLORIDE 20 ML: 2.5 INJECTION, SOLUTION EPIDURAL; INFILTRATION; INTRACAUDAL at 12:34

## 2019-01-22 RX ADMIN — PHENYLEPHRINE HYDROCHLORIDE 100 MCG: 10 INJECTION, SOLUTION INTRAMUSCULAR; INTRAVENOUS; SUBCUTANEOUS at 11:12

## 2019-01-22 RX ADMIN — ONDANSETRON 4 MG: 2 INJECTION INTRAMUSCULAR; INTRAVENOUS at 11:36

## 2019-01-22 RX ADMIN — KETOROLAC TROMETHAMINE 30 MG: 30 INJECTION, SOLUTION INTRAMUSCULAR at 23:49

## 2019-01-22 RX ADMIN — MORPHINE SULFATE 100 MCG: 1 INJECTION, SOLUTION EPIDURAL; INTRATHECAL; INTRAVENOUS at 11:01

## 2019-01-22 RX ADMIN — BUPIVACAINE HYDROCHLORIDE 1.6 ML: 7.5 INJECTION, SOLUTION EPIDURAL; RETROBULBAR at 11:01

## 2019-01-22 RX ADMIN — KETOROLAC TROMETHAMINE 30 MG: 30 INJECTION, SOLUTION INTRAMUSCULAR at 12:07

## 2019-01-22 RX ADMIN — PHENYLEPHRINE HYDROCHLORIDE 0.5 MCG/KG/MIN: 10 INJECTION, SOLUTION INTRAMUSCULAR; INTRAVENOUS; SUBCUTANEOUS at 11:01

## 2019-01-22 RX ADMIN — PHENYLEPHRINE HYDROCHLORIDE 200 MCG: 10 INJECTION, SOLUTION INTRAMUSCULAR; INTRAVENOUS; SUBCUTANEOUS at 11:28

## 2019-01-22 ASSESSMENT — MIFFLIN-ST. JEOR: SCORE: 1609.41

## 2019-01-22 NOTE — PROVIDER NOTIFICATION
"Pt arrived at 0840 for scheduled repeat C/Section. Polish interrater scheduled and present although pt does not seem to need as  has not interpreted.  Pt stated \"I only want Dr Mcmahan to touch my body\". Pt informed of resident program and pt spoke with Dr Mcmahan over Ascom phone and has now consented to our normal team approach for her care. Questions answered, prepared for C/Section and consents signed.   "

## 2019-01-22 NOTE — ANESTHESIA PREPROCEDURE EVALUATION
Anesthesia Pre-Procedure Evaluation    Patient: Heydi Loza   MRN:     [id number] Gender:   female   Age:    38 year old :      1980        Preoperative Diagnosis: Previous    Procedure(s):  Repeat  Section     Past Medical History:   Diagnosis Date     GBS (group B Streptococcus carrier), +RV culture, currently pregnant 8/10/2011     IUD -- Mirena 10/28/2011    10/10/13 Removed via hysteroscope in office w/o difficulty. Strings NOT visible extending from external cervical os.  US documenting intrauterine IUD obtained 2011 and 2013.        Menarche age 18    cycles 28-30 days x 5 days     Postpartum depression 2011     Sprain of ankle, unspecified site 2013     Varicosities     Hemorrhoids      Past Surgical History:   Procedure Laterality Date      SECTION  2011    Procedure: SECTION; Surgeon:ANDRES LU; Location:[facility] L[facility]      SECTION N/A 2015    Procedure:  SECTION;  Surgeon: Shalonda Rosales MD;  Location: [facility] L[facility]          Anesthesia Evaluation     . Pt has had prior anesthetic. Type: Regional and MAC    No history of anesthetic complications          ROS/MED HX    ENT/Pulmonary:  - neg pulmonary ROS     Neurologic:  - neg neurologic ROS     Cardiovascular:  - neg cardiovascular ROS       METS/Exercise Tolerance:  >4 METS   Hematologic:  - neg hematologic  ROS       Musculoskeletal: Comment: Back pain        GI/Hepatic:  - neg GI/hepatic ROS       Renal/Genitourinary:  - ROS Renal section negative       Endo:  - neg endo ROS       Psychiatric:     (+) psychiatric history depression and anxiety      Infectious Disease:  - neg infectious disease ROS       Malignancy:      - no malignancy   Other:    (+) Possibly pregnant C-spine cleared: Yes, H/O Chronic Pain,no other significant disability                    [facility] AN PHYSICAL EXAM    Lab Results   Component Value Date    WBC 9.2 2019    HGB 12.3 2019     "HCT 39.6 01/21/2019     01/21/2019     11/13/2015    POTASSIUM 4.0 11/13/2015    CHLORIDE 113 (H) 11/13/2015    CO2 22 11/13/2015    BUN 4 (L) 11/13/2015    CR 0.44 (L) 11/13/2015    GLC 91 11/13/2015    CASSIUS 7.6 (L) 11/13/2015    ALBUMIN 2.3 (L) 10/19/2015    PROTTOTAL 6.0 (L) 10/19/2015    ALT 12 10/19/2015    AST 21 10/19/2015    ALKPHOS 119 10/19/2015    BILITOTAL 0.2 10/19/2015    INR 0.91 10/19/2015    TSH 1.75 06/07/2012    HCG negative 10/28/2011       Preop Vitals  BP Readings from Last 3 Encounters:   10/04/18 93/59   05/29/17 115/65   09/06/16 105/74    Pulse Readings from Last 3 Encounters:   10/04/18 76   05/29/17 69   09/06/16 60      Resp Readings from Last 3 Encounters:   05/29/17 18   09/06/16 16   11/16/15 18    SpO2 Readings from Last 3 Encounters:   05/29/17 98%   09/06/16 100%   11/15/15 98%      Temp Readings from Last 1 Encounters:   05/29/17 36.4  C (97.5  F) (Oral)    Ht Readings from Last 1 Encounters:   01/05/16 1.58 m (5' 2.21\")      Wt Readings from Last 1 Encounters:   10/04/18 88.8 kg (195 lb 12.8 oz)    Estimated body mass index is 35.57 kg/m  as calculated from the following:    Height as of 1/5/16: 1.58 m (5' 2.21\").    Weight as of 10/4/18: 88.8 kg (195 lb 12.8 oz).     LDA:  Peripheral IV 08/11/11 Left Hand (Active)   Number of days: 2721       Urethral Catheter (Active)   Number of days: 1166            Assessment:   ASA SCORE: 2    NPO Status: > 6 hours since completed Solid [employer]   Documentation: H&P complete; Preop Testing complete; Consents complete   Proceeding: Proceed without further delay  Tobacco Use:  NO Active use of Tobacco/UNKNOWN Tobacco use status     Plan:   Anes. Type:  Regional; MAC     RA Details:  Labor/OB Procedure; SS; Exparel     RA-Location/Type: Spinal   Pre-Induction: None     Drips/Meds-Preparation: Oxytocin; Phenylephrine   Induction:  Not applicable   Airway: Native Airway   Access/Monitoring: PIV   Maintenance: N/a   Emergence: Not " Applicable   Logistics: Observation/Admission     Postop Pain/Sedation Strategy:  Standard-Options: Block SS; Exparel     PONV Management:  Adult Risk Factors: Female, Non-Smoker  Prevention: Ondansetron     CONSENT: Direct conversation   Plan and risks discussed with: Patient                            Santos Boykin DO

## 2019-01-22 NOTE — OP NOTE
[facility]   OPERATIVE NOTE:  SECTION     Surgery Date:  2019  Surgeon(s): Tiana Mcmahan MD  Assistants:  Geo Blount MD, [facility] Fellow                    Pema Scott MD     Preoperative Diagnoses:  1.  at 39w1d  2.  History of  section x 2    Postoperative diagnoses:  1.  at 39w1d, now delivered  2.  History of  section x 2    Procedure performed:   Repeat low segment transverse  section via pfannestiel incision with two- layer uterine closure    Anesthesia:  Spinal with duramorph  Est Blood Loss (mL): 1358 mL  Fluid replacement: 1800 mL crystalloid.   Specimens: None  Complications: None      Operative findings:   1. Single, liveborn male infant at 11:29 hours on 2019. Apgars of 9 and 9 at one and five minutes.  Birth weight: 3920 g.  Fetal presentation: vertex. Amniotic fluid: clear.    2. Placenta intact with 3 vessel cord.     4. Normal appearing uterus, fallopian tubes, ovaries.   5.  Minimal intraabdominal adhesions.  Minimal abdominal wall adhesions to the bladder.      Indication: Ms. Heydi Loza is a 38 year old  at 39w1d by LMP c/w 10w5d US, who presents for scheduled repeat  section.    The risks, benefits, and alternatives of  delivery were explained and the patient agreed to proceed.     Procedure details:  After obtaining informed consent the patient was taken to the operating room. She received cefazolin 2 gms prior to the skin incision. She was placed in the dorsal supine position with a leftward tilt and prepped and draped in the usual sterile fashion.  An appropriate patient safety Time Out was performed.      Following test of adequate spinal anesthesia, the abdomen was entered through a transverse skin incision through her previous scars. The skin incision was made sharply and carried through the subcutaneous tissue to the fascia.  Fascia was incised in the  midline and extended laterally with the Arteaga scissors.  The superior margin of the fascial incision was grasped with Kocher clamps and dissected from the underlying rectus muscles with sharp and blunt dissecton.The rectus muscles were  in the midline.    The peritoneum was entered and the opening extended by sharply with care to avoid the bladder. The bladder was noted have some adhesions suspended to the anterior peritoneum and after dissection of some of peritoneum the bladder was freed from the anterior abdominal wall. A bladder blade was placed. The vesicouterine peritoneum was entered sharply with Metzenbaum scissors and incision extended laterally. The bladder flap was created digitally and the bladder blade replaced. The lower segment of the uterus was opened sharply in a transverse fashion and extended with digital pressure. Membranes were ruptured with return of clear fluid.  The infant's head was elevated to the level of the hysterotomy and was delivered atraumatically, shoulders delivered easily thereafter. The cord was doubly clamped after 60 seconds and cut and the infant was handed off to the waiting nursing staff. A segment of the cord was cut and set aside for cord gases if needed.     The placenta was manually extracted as the umbilical cord avulsed.  The uterus was exteriorized from the abdomen and cleared of all clots and debris.  The uterus was massaged and was noted to be firm.  Pitocin was given through the running IV.  With massage as well as administration of pitocin, good uterine tone was achieved. The hysterotomy was repaired with 0-vicryl suture in a running locked fashion. A 2nd layer of 0-Vicryl was  used to imbricate the incision and good hemostasis was achieved. The bladder flap was inspected and found to be hemostatic.  The posterior cul-de-sac was cleared of clot and debris and the uterus was returned to the abdomen.      The bilateral pericolic gutters were clean of clot  and debris.  The hysterotomy was again inspected and found to be hemostatic.  A piece of Seprafilm was placed over the hysterotomy.  The abdominal wall was examined and also found to be hemostatic. Sepra-film was placed over the rectus muscles.  The fascia was closed with a running suture of 0-Vicryl.  Subcutaneous tissue was irrigated. Areas that were not hemostatic were controlled with cautery. The subcutaneous tissue was 3cm in depth and did require re-approximation with 3-0 vicryl. The skin was closed with 4-0 vicryl. The patient tolerated the procedure well and was taken to the recovery room in stable condition. All sponge, needle and instrument counts were correct x2.     Dr. Mcmahan was present for the entire procedure.     Geo Blount MD  [facility] Fellow  January 22, 2019 , 10:45 AM      I was scrubbed and present for the entire procedure.  I have reviewed and edited the above note.    Tiana Mcmahan MD, FACOG

## 2019-01-22 NOTE — PLAN OF CARE
Patient to OR via ambulation. Delivered C/S by Dr. Mcmahan. Baby delivered with lusty cry, cord clamping delayed 60 seconds, cut and brought to prewarmed infant warmer. Infant stimulated and dried. Apgars 9/9. Brought to mother for skin to skin bonding.

## 2019-01-22 NOTE — ANESTHESIA CARE TRANSFER NOTE
Patient: Heydi Loza    Procedure(s):  Repeat  Section    Diagnosis: Previous   Diagnosis Additional Information: No value filed.    Anesthesia Type:   No value filed.     Note:  Airway :Room Air  Patient transferred to:PACU  Comments: VSS. Breathing spontaneously at a regular rate with adequate tidal volumes and maintaining O2 sats on RA. Denies nausea or pain. No apparent complications from anesthesia.     Santos Boykin DO  CA-2  Handoff Report: Identifed the Patient, Identified the Reponsible Provider, Reviewed the pertinent medical history, Discussed the surgical course, Reviewed Intra-OP anesthesia mangement and issues during anesthesia, Set expectations for post-procedure period and Allowed opportunity for questions and acknowledgement of understanding      Vitals: (Last set prior to Anesthesia Care Transfer)    CRNA VITALS  2019 1142 - 2019 1240      2019             NIBP:  107/85    Pulse:  75    NIBP Mean:  91    SpO2:  97 %    Resp Rate (observed):  1  (Abnormal)                 Electronically Signed By: Santos Boykin DO  2019  12:40 PM

## 2019-01-22 NOTE — ANESTHESIA PROCEDURE NOTES
Peripheral Nerve Block Procedure Note    Staff:     Anesthesiologist:  Pema Suh MD    Resident/CRNA:  Santos Boykin DO    Block performed by resident/CRNA in the presence of a teaching physician    Location: OB and PACU  Procedure Start/Stop TImes:      2019 12:25 PM     2019 12:35 PM    patient identified, IV checked, site marked, risks and benefits discussed, informed consent, monitors and equipment checked, pre-op evaluation, at physician/surgeon's request and post-op pain management      Correct Patient: Yes      Correct Position: Yes      Correct Site: Yes      Correct Procedure: Yes      Correct Laterality:  Yes    Site Marked:  Yes  Procedure details:     Procedure:  TAP    ASA:  2    Diagnosis:  Post  pain control    Laterality:  Bilateral    Position:  Supine    Sterile Prep: chloraprep, patient draped, mask and sterile gloves      Local skin infiltration:  None    Needle:  Short bevel    Needle gauge:  21    Needle length (inches):  3.13    Ultrasound: Yes      Ultrasound used to identify targeted nerve, plexus, or vascular structure and placed a needle adjacent to it      Permanent Image entered into patiient's record      Abnormal pain on injection: No      Blood Aspirated: No      Paresthesias:  No    Bleeding at site: No      Bolus via:  Needle    Infusion Method:  Single Shot    Complications:  None  Assessment/Narrative:     Injection made incrementally with aspirations every (mL):  5

## 2019-01-22 NOTE — ANESTHESIA PROCEDURE NOTES
Spinal/LP Procedure Note    Spinal Block  Staff:     Anesthesiologist:  Pema Suh MD    Resident/CRNA:  Santos Boykin DO    Spinal/LP performed by resident/CRNA in presence of a teaching physician.    Location: OB and In OR BEFORE Induction  Procedure Start/Stop Times:      2019 10:50 AM     2019 11:02 AM    patient identified, IV checked, site marked, risks and benefits discussed, informed consent, monitors and equipment checked, pre-op evaluation, at physician/surgeon's request and post-op pain management      Correct Patient: Yes      Correct Position: Yes      Correct Site: Yes      Correct Procedure: Yes      Correct Laterality:  Yes    Site Marked:  Yes  Procedure:     Procedure:  Intrathecal    ASA:  2    Diagnosis:      Position:  Sitting    Sterile Prep: chloraprep, mask, sterile gloves and patient draped      Insertion site:  L3-4    Approach:  Midline    Needle Type:  Cristobal    Needle gauge (G):  25    Local Skin Infiltration:  1% lidocaine    amount (ml):  5    Needle Length (in):  3.5    Introducer used: Yes      Introducer gauge:  20 G    Attempts:  2    Redirects:  2    CSF:  Clear    Paresthesias:  No    Time injected:  11:01  Assessment/Narrative:     Sensory Level:  T4

## 2019-01-22 NOTE — DISCHARGE SUMMARY
[facility] Discharge Summary    Heydi Loza MRN# [id number]   Age: 38 year old YOB: 1980     Date of Admission:  2019  Date of Discharge::  2019  Admitting Physician:  Tiana Mcmahan MD  Discharge Physician:  Beatriz Naqvi MD             Admission Diagnoses:    at 39w1d  Hx of c/s x2   AMA - normal quad screen, anatomy scan  Class I obesity          Discharge Diagnosis:   Same, delivered             Procedures:   Procedure(s): Repeat low transverse  section with double layer closure via Pfannenstiel skin incision  Spinal anesthesia  TAP block                Medications Prior to Admission:     Medications Prior to Admission   Medication Sig Dispense Refill Last Dose     Prenatal Vit-Fe Fumarate-FA (PRENATAL MULTIVITAMIN W/IRON) 27-0.8 MG tablet Take 1 tablet by mouth daily        Vitamin D, Cholecalciferol, 1000 units TABS    2019 at Unknown time             Discharge Medications:        Review of your medicines      START taking      Dose / Directions   acetaminophen 325 MG tablet  Commonly known as:  TYLENOL      Dose:  650 mg  Take 2 tablets (650 mg) by mouth every 6 hours as needed for mild pain or fever  Quantity:  60 tablet  Refills:  0     ferrous gluconate 324 (38 Fe) MG tablet  Commonly known as:  FERGON      Dose:  324 mg  Take 1 tablet (324 mg) by mouth daily (with breakfast)  Quantity:  30 tablet  Refills:  0     ibuprofen 800 MG tablet  Commonly known as:  ADVIL/MOTRIN      Dose:  800 mg  Take 1 tablet (800 mg) by mouth every 6 hours as needed for other (cramping)  Quantity:  60 tablet  Refills:  0     oxyCODONE 5 MG tablet  Commonly known as:  ROXICODONE      Dose:  5 mg  Take 1 tablet (5 mg) by mouth every 6 hours as needed for moderate to severe pain  Quantity:  12 tablet  Refills:  0     senna-docusate 8.6-50 MG tablet  Commonly known as:  SENOKOT-S/PERICOLACE      Dose:  1 tablet  Take 1 tablet by mouth 2 times daily as needed  for constipation  Quantity:  100 tablet  Refills:  0        CONTINUE these medicines which have NOT CHANGED      Dose / Directions   prenatal multivitamin w/iron 27-0.8 MG tablet  Indication:  Pregnancy      Dose:  1 tablet  Take 1 tablet by mouth daily  Refills:  0     Vitamin D (Cholecalciferol) 1000 units Tabs      Refills:  0           Where to get your medicines      These medications were sent to [facility] [place], [place] - [address]  [address] [facility] [address], [facility] [address]    Phone:  [phone]     acetaminophen 325 MG tablet    ferrous gluconate 324 (38 Fe) MG tablet    ibuprofen 800 MG tablet    senna-docusate 8.6-50 MG tablet     Some of these will need a paper prescription and others can be bought over the counter. Ask your nurse if you have questions.    Bring a paper prescription for each of these medications    oxyCODONE 5 MG tablet                 Consultations:   Anesthesia          Brief Admission History:   Ms. Heydi Loza is a 38 year old  at 39w1d by LMP c/w 10w5d US, who presents for scheduled repeat  section.  She denies contractions, vaginal bleeding, and loss of fluid. + normal fetal movement.     Intraoperative course   The procedure was uncomplicated.  EBL 1358 mL.  See operative report for details.     1. Single, viable male infant at 11:29 hours on 2019. Apgars of 9 and 9 at one and five minutes.  Birth weight: 3920 g.  Fetal presentation: vertex. Amniotic fluid: clear.    2. Placenta intact with 3 vessel cord.     3. Normal appearing uterus, fallopian tubes, ovaries.   4. Minimal intraabdominal adhesions.  Minimal abdominal wall adhesions to the bladder.         Postpartum Course   The patient's hospital course was unremarkable.  She recovered as anticipated and experienced no post-operative complications.  On discharge, her pain was well controlled. Vaginal bleeding is similar to peak menstrual flow.  Voiding without difficulty.   Ambulating well and tolerating a normal diet.  No fever or significant wound drainage.  Breastfeeding well.  Infant is stable.   She was discharged on post-partum day #3.    Post-partum hemoglobin:   Hemoglobin   Date Value Ref Range Status   01/23/2019 10.0 (L) 11.7 - 15.7 g/dL Final             Discharge Instructions and Follow-Up:   Discharge diet: Regular   Discharge activity: No lifting greater than 20 lbs, pushing, pulling, or other strenuous activity for 6 weeks. Pelvic rest for 6 weeks including no sexual intercourse, tampons, or douching. No driving until you can slam on the breaks without pain or while on narcotic pain medications.    Discharge follow-up: Follow up with primary OB for routine postpartum visit in 6 weeks     Wound care: Keep incision clean and dry           Discharge Disposition:   Discharged to home in good condition       Herlinda Weiss MD PhD  Ob/Gyn PGY-3  1/25/2019 6:51 AM       [facility] staff:  Appreciate note by Dr. Weiss.  I have seen and examined the patient without the resident. I have reviewed, edited, and agree with the note.        Beatriz Naqvi MD, FACOG  1/25/2019  11:18 AM

## 2019-01-22 NOTE — H&P
[facility]  OB History and Physical    Heydi Herring MRN# [id number]   Age: 38 year old YOB: 1980       HPI:  Ms. Heydi Herring is a 38 year old  at 39w1d by LMP c/w 10w5d US, who presents for scheduled repeat  section.  She denies contractions, vaginal bleeding, and loss of fluid. + normal fetal movement.    Pregnancy Complications:  1.  Hx of c/s x2. First for Arrest of dilation at 5cm, Second for repeat due to no spontaneous labor before 41wk. This was complicated by hemorrhage secondary to atony, and also bladder dome injury that required intraoperative urology consult  2. AMA - Quad screen negative, anatomy scan wnl  3. Class I Obesity,35.6  - 93.4 kg. ( 206lbs)     Prenatal Labs: ([facility] everywhere, [facility])  Rh Pos, Ab negative  HepB immune  HIV negative  RPR negative  HepC negative  Varicella nonimmune  1hr Glucose test 128  Rubella immune   Gc/Chlam negative    GBS Status:   Negative 2019 [facility]    Ultrasounds  1. 9 at 22 weeks. EFW 52%ile, Anterior placenta, 3vc.     OB History  Obstetric History       T2      L2     SAB0   TAB0   Ectopic0   Multiple0   Live Births2       # Outcome Date GA Lbr Jose/2nd Weight Sex Delivery Anes PTL Lv   3 Current            2 Term 11/13/15 41w0d  4.59 kg (10 lb 1.9 oz) F CS-LTranv Spinal  [facility]      Apgar1:  9                Apgar5: 9   1 Term 11 42w0d  3.99 kg (8 lb 12.7 oz) F  Spinal N [facility]      Name: JAN HERRING      Apgar1:  8                Apgar5: 9          PMHx: Denies history of HTN, Asthma, thrombosis or diabetes  Past Medical History:   Diagnosis Date     GBS (group B Streptococcus carrier), +RV culture, currently pregnant 8/10/2011     IUD -- Mirena 10/28/2011    10/10/13 Removed via hysteroscope in office w/o difficulty. Strings NOT visible extending from external cervical os.  US documenting intrauterine IUD obtained 2011 and 2013.         Menarche age 18    cycles 28-30 days x 5 days     Postpartum depression 2011     Sprain of ankle, unspecified site 2013     Varicosities     Hemorrhoids     PSHx:  Past Surgical History:   Procedure Laterality Date      SECTION  2011    Procedure: SECTION; Surgeon:ANDRES LU; Location:[facility] L+D      SECTION N/A 2015    Procedure:  SECTION;  Surgeon: Shalonda Rosales MD;  Location: [facility] L+D     Meds:   No medications prior to admission.     Allergies:    Allergies   Allergen Reactions     Nkda [No Known Drug Allergies]       FmHx:   Family History   Problem Relation Age of Onset     Family History Negative No family hx of      SocHx: She denies any tobacco, alcohol, or other drug use during this pregnancy.    ROS:   Complete 10-point ROS negative except as noted in HPI.She denies headache, blurry vision, chest pain, shortness of breath, RUQ pain, nausea, vomiting, dysuria, hematuria or extremity edema.    PE:  Vit:   Gen: Well-appearing, NAD, comfortable   CV: RRR, no mrg   Pulm: CTAB, no wheezes or crackles   Abd: Soft, gravid, non-tender  Ext: No LE edema b/l  Cx: Deferred  Bedside US: Cephalic    FHT: Baseline 140s, mod variability, 15x15 accelerations , no decelerations   [place]: no contractions    Assessment  Ms. Heydi Loza is a 38 year old , at 39w1d by 10w5d US, who presents for scheduled repeat  section.    Plan  Admit to L&D for scheduled repeat  section. The risks, benefits, and alternatives of  section were discussed, including the risks of bleeding, infection, injury to surrounding organs, fetal injury, and remote risks of hysterectomy. She consented to a blood transfusion in the event of a life threatening amount of bleeding. She had time to ask questions and agreed to proceed. Surgical consent was signed.    Previous c/s operative note - 5cm bladder dome injury on entry secondary to blunt dissection  and friable tissue. Findings: mildly thickened fascial adhesions. Minimal intraabdominal adhesions. Omental adhesions to fascia in midline.     Pain:  Spinal per anesthesia.   ID:  Ancef for jose-op antibiotics.  FWB: Category 1 FHT.  Continue EFM.   PNC: Rh pos, Rubella immune (7/5/2018 Dr. Lima), Varicella nonimmune, GBS neg, Placenta anterior  Fen/GI: NPO, IVFs.  PPH ppx: CBC, T&S.      The patient was discussed with Dr. Mcmahan who is in agreement with the treatment plan.    Patient declined resident involvement. She inquired about my level of training and I discussed I have completed training in OBGYN and currently I am subspecializing in maternal fetal medicine. She is okay with me to be involved in her c/section.     Geo Blount MD  [facility] Fellow   January 21, 2019 , 7:53 PM      The patient was seen and examined by me separately from the team.  I have reviewed and agree with the above assessment and plan of care.    Tiana Mcmahan MD, FACOG

## 2019-01-22 NOTE — PLAN OF CARE
Data: Heydi Loza transferred to 7-139 via cart at 1405. Baby transferred via parent's arms.  Action: Receiving unit notified of transfer: Yes. Patient and family notified of room change. Report given to KASHIF Vee at 1330. Belongings sent to receiving unit. Accompanied by Registered Nurse. Oriented patient to surroundings. Call light within reach. ID bands double-checked with receiving RN.  Response: Patient tolerated transfer and is stable.

## 2019-01-22 NOTE — PROGRESS NOTES
BP 's/50's. Per pt, BP at baseline. Denies dizziness. [place] with clear and yellow urine of adequate amount. Pitocin infusing. Small amount of lochia noted. Fundus 2/U on admission but now at U/U. Tolerating regular diet and fluids without N/V. Breast feeding well. Drsg c/d/i. C/o minimal pain and medicated to stay on top of pain. Will continue to monitor.

## 2019-01-22 NOTE — ANESTHESIA POSTPROCEDURE EVALUATION
Anesthesia POST Procedure Evaluation    Patient: Heydi Loza   MRN:     [id number] Gender:   female   Age:    38 year old :      1980        Preoperative Diagnosis: Previous    Procedure(s):  Repeat  Section   Postop Comments: No value filed.       Anesthesia Type:  Regional, MAC    Reportable Event: NO     PAIN:   Sign Out status: Comfortable, Well controlled pain     PONV: No PONV   Sign Out sttus:  No Nausea or Vomiting     Neuro/Psych: Uneventful perioperative course   Sign Out Status: Preoperative baseline; Age appropriate mentation     Airway/Resp.: Uneventful perioperative course   Sign Out Status: Non labored breathing, age appropriate RR; Resp. Status within EXPECTED Parameters     CV: Uneventful perioperative course   Sign Out status: Appropriate BP and perfusion indices; Appropriate HR/Rhythm     Disposition:   Sign Out in:  PACU  Disposition:  Floor  Recovery Course: Uneventful           Last Anesthesia Record Vitals:  [facility] VITALS  2019 1142 - 2019 1242      2019             NIBP:  107/85    Pulse:  75    NIBP Mean:  91    SpO2:  97 %    Resp Rate (observed):  1  (Abnormal)           Last PACU/Preop Vitals:  Vitals:    19 1355 19 1420 19 1506   BP: 101/54 90/58 91/49   Pulse:  81 86   Resp: 18 16 16   Temp: 36.6  C (97.9  F) 36.9  C (98.4  F) 37.2  C (99  F)   SpO2: 99% 99%          Electronically Signed By: Pema Suh MD, 2019, 3:54 PM

## 2019-01-23 LAB — HGB BLD-MCNC: 10 G/DL (ref 11.7–15.7)

## 2019-01-23 PROCEDURE — 12000001 ZZH R&B MED SURG/OB UMMC

## 2019-01-23 PROCEDURE — 85018 HEMOGLOBIN: CPT | Performed by: STUDENT IN AN ORGANIZED HEALTH CARE EDUCATION/TRAINING PROGRAM

## 2019-01-23 PROCEDURE — 36415 COLL VENOUS BLD VENIPUNCTURE: CPT | Performed by: STUDENT IN AN ORGANIZED HEALTH CARE EDUCATION/TRAINING PROGRAM

## 2019-01-23 PROCEDURE — 25000132 ZZH RX MED GY IP 250 OP 250 PS 637: Performed by: OBSTETRICS & GYNECOLOGY

## 2019-01-23 PROCEDURE — 25000132 ZZH RX MED GY IP 250 OP 250 PS 637: Performed by: STUDENT IN AN ORGANIZED HEALTH CARE EDUCATION/TRAINING PROGRAM

## 2019-01-23 PROCEDURE — 25000128 H RX IP 250 OP 636: Performed by: STUDENT IN AN ORGANIZED HEALTH CARE EDUCATION/TRAINING PROGRAM

## 2019-01-23 RX ORDER — AMOXICILLIN 250 MG
1 CAPSULE ORAL 2 TIMES DAILY PRN
Qty: 100 TABLET | Refills: 0 | Status: SHIPPED | OUTPATIENT
Start: 2019-01-23 | End: 2019-02-22

## 2019-01-23 RX ORDER — IBUPROFEN 800 MG/1
800 TABLET, FILM COATED ORAL EVERY 6 HOURS PRN
Qty: 60 TABLET | Refills: 0 | Status: SHIPPED | OUTPATIENT
Start: 2019-01-23 | End: 2019-02-22

## 2019-01-23 RX ORDER — ACETAMINOPHEN 325 MG/1
650 TABLET ORAL EVERY 6 HOURS PRN
Qty: 60 TABLET | Refills: 0 | Status: SHIPPED | OUTPATIENT
Start: 2019-01-23 | End: 2019-02-22

## 2019-01-23 RX ADMIN — OXYCODONE HYDROCHLORIDE 5 MG: 5 TABLET ORAL at 17:54

## 2019-01-23 RX ADMIN — KETOROLAC TROMETHAMINE 30 MG: 30 INJECTION, SOLUTION INTRAMUSCULAR at 06:06

## 2019-01-23 RX ADMIN — IBUPROFEN 800 MG: 800 TABLET, FILM COATED ORAL at 17:53

## 2019-01-23 RX ADMIN — ACETAMINOPHEN 650 MG: 325 TABLET, FILM COATED ORAL at 17:54

## 2019-01-23 RX ADMIN — OXYCODONE HYDROCHLORIDE 10 MG: 5 TABLET ORAL at 00:03

## 2019-01-23 RX ADMIN — ACETAMINOPHEN 650 MG: 325 TABLET, FILM COATED ORAL at 06:06

## 2019-01-23 RX ADMIN — SENNOSIDES AND DOCUSATE SODIUM 2 TABLET: 8.6; 5 TABLET ORAL at 21:44

## 2019-01-23 RX ADMIN — OXYCODONE HYDROCHLORIDE 5 MG: 5 TABLET ORAL at 10:57

## 2019-01-23 RX ADMIN — ENOXAPARIN SODIUM 40 MG: 40 INJECTION SUBCUTANEOUS at 12:17

## 2019-01-23 RX ADMIN — OXYCODONE HYDROCHLORIDE 5 MG: 5 TABLET ORAL at 15:48

## 2019-01-23 RX ADMIN — ACETAMINOPHEN 650 MG: 325 TABLET, FILM COATED ORAL at 11:45

## 2019-01-23 RX ADMIN — KETOROLAC TROMETHAMINE 30 MG: 30 INJECTION, SOLUTION INTRAMUSCULAR at 11:45

## 2019-01-23 RX ADMIN — SIMETHICONE CHEW TAB 80 MG 80 MG: 80 TABLET ORAL at 21:44

## 2019-01-23 RX ADMIN — OXYCODONE HYDROCHLORIDE 5 MG: 5 TABLET ORAL at 22:14

## 2019-01-23 RX ADMIN — SENNOSIDES AND DOCUSATE SODIUM 2 TABLET: 8.6; 5 TABLET ORAL at 08:04

## 2019-01-23 RX ADMIN — ACETAMINOPHEN 650 MG: 325 TABLET, FILM COATED ORAL at 00:03

## 2019-01-23 NOTE — PROVIDER NOTIFICATION
Lovenox ordered for pt and pt wants to know why. No history of blood clots. Dr. Chiang/G2 paged and updated.

## 2019-01-23 NOTE — PROVIDER NOTIFICATION
Notified G3 Dr Snowden and asked for something for Pt's itching. Dr Snowden  Called back and gave verbal order for 25mg of Benadryl.     01/22/19 9385   Provider Notification   Provider Name/Title Dr Chiang    Method of Notification Electronic Page   Request Evaluate-Remote   Notification Reason Medication Request  (pt would like something for itching.)

## 2019-01-23 NOTE — PROVIDER NOTIFICATION
01/22/19 0488   Provider Notification   Provider Name/Title G2 Mariia   Method of Notification Electronic Page   Request Evaluate-Remote   Notification Reason Medication Request  (Requested Oxycodone for Pt' pain.)

## 2019-01-23 NOTE — PROVIDER NOTIFICATION
01/22/19 2227   Provider Notification   Provider Name/Title G2 Dr Chiang   Method of Notification Electronic Page   Request Evaluate-Remote   Notification Reason Medication Request  (Pt requesting something for pain.)   Notified G2 to request some oxycodone for Pt. Pt did not have an order for this medication.

## 2019-01-23 NOTE — PROGRESS NOTES
[facility]   Post-partum Note    Name:  Heydi Loza  MRN: [id number]    S: Patient is feeling well today.  Pain is controlled.  Denies dizziness, chest pain, SOB, nausea or vomiting. Tolerating regular diet without nausea or vomiting.  Ambulating without dizziness. The anne catheter was just removed and she has not spontaneously voided yet.  Lochia is appropriate.  Breast feeding.  Undecided for postpartum contraception.     O:   Patient Vitals for the past 24 hrs:   BP Temp Temp src Pulse Heart Rate Resp SpO2 Height Weight   01/23/19 0430 (!) 86/44 98.9  F (37.2  C) Oral -- 68 -- 99 % -- --   01/23/19 0155 -- -- -- -- -- 16 100 % -- --   01/22/19 2355 99/52 97.4  F (36.3  C) Oral -- 71 18 99 % -- --   01/22/19 2227 -- -- -- -- -- 16 100 % -- --   01/22/19 2159 -- -- -- -- -- 16 100 % -- --   01/22/19 2100 -- -- -- -- -- 16 99 % -- --   01/22/19 2000 94/53 99  F (37.2  C) Oral 78 -- 16 100 % -- --   01/22/19 1813 100/52 98.6  F (37  C) Oral 81 -- 16 100 % -- --   01/22/19 1700 -- -- -- -- -- 16 100 % -- --   01/22/19 1621 94/52 98.8  F (37.1  C) Oral 81 -- 16 99 % -- --   01/22/19 1506 91/49 99  F (37.2  C) Oral 86 -- 16 100 % -- --   01/22/19 1420 90/58 98.4  F (36.9  C) Oral 81 -- 16 99 % -- --   01/22/19 1355 101/54 97.9  F (36.6  C) Oral -- 70 18 99 % -- --   01/22/19 1350 96/54 -- -- 71 83 29 99 % -- --   01/22/19 1345 (!) 88/54 -- -- 67 63 18 99 % -- --   01/22/19 1335 (!) 82/55 -- -- 72 75 13 100 % -- --   01/22/19 1320 93/54 -- -- 70 74 (!) 39 99 % -- --   01/22/19 1305 98/53 -- -- 97 72 22 -- -- --   01/22/19 1300 (!) 84/53 -- -- 78 82 24 100 % -- --   01/22/19 1255 (!) 87/53 -- -- 84 74 14 99 % -- --   01/22/19 1245 91/56 -- -- 75 91 13 99 % -- --   01/22/19 1240 (!) 84/46 -- -- 91 92 14 100 % -- --   01/22/19 1235 (!) 89/51 -- -- 94 74 14 96 % -- --   01/22/19 1230 (!) 89/54 -- -- 87 81 12 100 % -- --   01/22/19 1225 94/56 -- -- 71 75 13 99 % -- --   01/22/19 1220 93/56  "-- -- 91 84 21 95 % -- --   19 1215 98/63 -- -- 80 -- 20 97 % -- --   19 0920 111/68 98.5  F (36.9  C) Oral -- -- 18 -- -- --   19 -- -- -- -- -- 18 -- 1.626 m (5' 4\") 93.4 kg (206 lb)     Gen:  Resting comfortably, NAD  CV:  Well perfused  Pulm:  No increased work of breathing  Abd:  Soft, appropriately ttp, non-distended.Fundus below umbilicus, firm and non-tender.  Incision: covered by a bandage that is clean and dry  Ext:  non-tender, 1+ LE edema b/l    I/O last 3 completed shifts:  In: 2100 [P.O.:150; I.V.:1950]  Out: 3308 [Urine:1950; Blood:1358]    Hgb:   Hemoglobin   Date Value Ref Range Status   2019 12.3 11.7 - 15.7 g/dL Final       Assessment/Plan:  Heydi Loza is a 38 year old  on POD#1 s/p RLTCS.    - continue with routine postpartum management  Pain: Well-controlled with PO pain medicatins  Hgb: 12.3>EBL 1358>10.0. Patient is asymptomatic from acute blood loss anemia and vitals are reassuring will discharge home with PO iron if hgb <10  Rh: Positive, no rhogam needed  Rubella: immune  Feed: breast  BC: Undecided   Dispo: DC POD#2-3    Herlinda Weiss MD PhD  Ob/Gyn PGY-3  2019 6:37 AM    Staff MD Note    I appreciate the note by Dr. Weiss.  Any necessary changes have been made by me.  I evaluated the patient with the resident and agree with the assessment and plan.    Shalonda Rosales MD    "

## 2019-01-23 NOTE — PROVIDER NOTIFICATION
Notified Dr Snowden about pt having lower blood pressures. 86/44. Pt having good output, not feeling symptomatic to low blood pressure. Dr Snowden said we could just watch it and that pt probably runs a lower blood pressure.      01/23/19 0447   Urethral Catheter Latex   Placement Date/Time: 01/22/19 1108   Pre-existing: No  Inserted by: MAME Vasquez RN  Rationale for Insertion: Anesthesia  Catheter Type: Latex  Catheter Balloon Size: 10 mL  Collection Container: Standard  Urine Returned: Yes   Urine Output 600 mL   Provider Notification   Provider Name/Title Dr Snowden   Method of Notification Electronic Page   Request Evaluate-Remote   Notification Reason Vital Signs Change

## 2019-01-23 NOTE — PROVIDER NOTIFICATION
Pt st cathed at 1335 for 700cc. Attempted to void at 1550 but unable to void. Will retry in 2hrs. Text paged Dr. Chiang/g2 and updated.     Dr. Chan called back. States if needs to st cath a second time then update doctor and re-insert anne.

## 2019-01-23 NOTE — PROGRESS NOTES
"Post  Anesthesia Follow Up Note    Patient: Heydi oLza    Patient location: Postpartum floor.    Chief complaint: Acute postoperative pain management s/p intrathecal morphine administration     Procedure(s) Performed:  Procedure(s):  Repeat  Section    Anesthesia type: Spinal Block      Subjective:     The patient reports good pain control.  She reports mild pruritus. The patient denies weakness, paresthesia, difficulties breathing or voiding, nausea or vomiting. She is able to ambulate and tolerates regular diet.      Objective:    Respiratory Function (RR / SpO2 / Airway Patency): Satisfactory    Cardiac Function (HR / Rhythm / BP): Satisfactory    Pain Control: 3/10    Strength and sensation lower extremities: Normal    Site of spinal/epidural insertion: No signs of infection or inflammation.     Last Vitals: BP 96/53   Pulse 84   Temp 37.2  C (99  F) (Oral)   Resp 16   Ht 1.626 m (5' 4\")   Wt 93.4 kg (206 lb)   LMP 2018   SpO2 99%   Breastfeeding? Unknown   BMI 35.36 kg/m      Assessment and plan:   Heydi Loza is a 38 year old female  POD #1 s/p   SECTION with IT bupivicaine(1.6ml) and morphine, (100 mcg), and single shot TAP nerve block injections with 20 mL bupivacaine 0.25%  then 20mL liposome bupivacaine (Exparel) long-acting 1.3% given in the PACU for postoperative analgesia.  Pt is ambulating withou difficulty, no weakness or paresthesias.  No evidence of adverse side effects associated with spinal and nerve block injections. Pt is receiving adequate incisional pain control.  Anticipate up to 72 hours of incisional pain control.  Anticipate patient will require opioid/nonopioid analgesics for visceral and muscle pain not controlled with local anesthetic.      Her post-operative analgesia is Well controlled today. Further interventional analgesic strategies would be of little utility at this time. Thus, we recommend proceeding with PO analgesics " including staggered dosing of NSAIDs (ibuprofen) and acetaminophen, with a taper of oxycodone.     Thank you for including us in the care for this patient.    Santos Boykin DO  CA-2, PGY3  Anesthesiology Resident

## 2019-01-23 NOTE — PLAN OF CARE
Pt has been stable this shift with vital signs. Pt was doing well with Toradol and tylenol but requested some oxycodone for pain. Pt was able to get good pain relief with the medication. Pt having good output and taking in adequate amounts of fluids so IV was saline locked. Will take anne catheter out towards morning. Pt is breastfeeding her baby with good latch verified. Pt was able to ambulate in room and get washed up in the bathroom without much difficulty. Will continue to assess for changes and assist as needed.

## 2019-01-23 NOTE — PLAN OF CARE
VSS ex soft BP; improving. Denies SOB, headaches, dizziness.  LS clear. BS audible and active in all quadrants. Passing flatus. Last BM 1/20.  Postpartum assessment WDL. Abd incision intact. Lochia present, scant.  Pt due to void; anne out at 0615. Had 2 unsuccessful voiding attempts so had to be straight cathed. Output = 700mL.  Breastfeeds baby with no assistance. Bonding well with baby. Able to make needs known. Continue plan of care.

## 2019-01-24 PROCEDURE — 25000132 ZZH RX MED GY IP 250 OP 250 PS 637: Performed by: STUDENT IN AN ORGANIZED HEALTH CARE EDUCATION/TRAINING PROGRAM

## 2019-01-24 PROCEDURE — 25000132 ZZH RX MED GY IP 250 OP 250 PS 637: Performed by: OBSTETRICS & GYNECOLOGY

## 2019-01-24 PROCEDURE — 12000001 ZZH R&B MED SURG/OB UMMC

## 2019-01-24 PROCEDURE — 25000128 H RX IP 250 OP 636: Performed by: STUDENT IN AN ORGANIZED HEALTH CARE EDUCATION/TRAINING PROGRAM

## 2019-01-24 RX ORDER — FERROUS GLUCONATE 324(38)MG
324 TABLET ORAL
Qty: 30 TABLET | Refills: 0 | Status: SHIPPED | OUTPATIENT
Start: 2019-01-24 | End: 2019-02-23

## 2019-01-24 RX ORDER — OXYCODONE HYDROCHLORIDE 5 MG/1
5 TABLET ORAL EVERY 6 HOURS PRN
Qty: 12 TABLET | Refills: 0 | Status: SHIPPED | OUTPATIENT
Start: 2019-01-24 | End: 2019-01-27

## 2019-01-24 RX ADMIN — IBUPROFEN 800 MG: 800 TABLET, FILM COATED ORAL at 08:49

## 2019-01-24 RX ADMIN — ACETAMINOPHEN 650 MG: 325 TABLET, FILM COATED ORAL at 14:52

## 2019-01-24 RX ADMIN — IBUPROFEN 800 MG: 800 TABLET, FILM COATED ORAL at 20:35

## 2019-01-24 RX ADMIN — ACETAMINOPHEN 650 MG: 325 TABLET, FILM COATED ORAL at 20:35

## 2019-01-24 RX ADMIN — OXYCODONE HYDROCHLORIDE 10 MG: 5 TABLET ORAL at 17:09

## 2019-01-24 RX ADMIN — ACETAMINOPHEN 650 MG: 325 TABLET, FILM COATED ORAL at 06:38

## 2019-01-24 RX ADMIN — SENNOSIDES AND DOCUSATE SODIUM 1 TABLET: 8.6; 5 TABLET ORAL at 08:48

## 2019-01-24 RX ADMIN — ACETAMINOPHEN 650 MG: 325 TABLET, FILM COATED ORAL at 00:31

## 2019-01-24 RX ADMIN — IBUPROFEN 800 MG: 800 TABLET, FILM COATED ORAL at 00:31

## 2019-01-24 RX ADMIN — SENNOSIDES AND DOCUSATE SODIUM 2 TABLET: 8.6; 5 TABLET ORAL at 20:35

## 2019-01-24 RX ADMIN — ENOXAPARIN SODIUM 40 MG: 40 INJECTION SUBCUTANEOUS at 14:53

## 2019-01-24 RX ADMIN — OXYCODONE HYDROCHLORIDE 10 MG: 5 TABLET ORAL at 02:18

## 2019-01-24 RX ADMIN — OXYCODONE HYDROCHLORIDE 10 MG: 5 TABLET ORAL at 06:38

## 2019-01-24 RX ADMIN — ONDANSETRON 4 MG: 2 INJECTION INTRAMUSCULAR; INTRAVENOUS at 09:46

## 2019-01-24 NOTE — PROVIDER NOTIFICATION
01/24/19 1650   Provider Notification   Provider Name/Title Dr. Chiang (G2)   Method of Notification Electronic Page   Request Evaluate-Remote   Notification Reason Medication Request;Other   Patient on Lovenox and also has Ibuprofen ordered? Should Ibuprofen be discontinued? Thank you.

## 2019-01-24 NOTE — PROVIDER NOTIFICATION
01/23/19 1939   Provider Notification   Provider Name/Title G2 Sp   Method of Notification Electronic Page   Notification Reason Status Update  (pt able to void)   Patient was able to void! 600mL.

## 2019-01-24 NOTE — PLAN OF CARE
Data: Vital signs within normal limits, BPs still 90s/50s but pt asymptomatic. Postpartum checks within normal limits - see flow record. Patient eating and drinking normally. Patient able to empty bladder independently without difficulty. No apparent signs of infection. Incision healing well. Patient performing self cares and is able to care for infant. Breastfeeding infant independently. Also began supplementing formula overnight per mother's request. Mother plans to breast and formula feed via bottle at home.   Action: Patient medicated during the shift for pain. See MAR. Patient reassessed within 1 hour after each medication and pain was improved - patient stated she was comfortable. Patient education done about formula supplementation . See flow record.  Response: Positive attachment behaviors observed with infant. Support persons present.   Plan: Anticipate discharge on 1/24 or 1/25.

## 2019-01-24 NOTE — PLAN OF CARE
VSS. Afebrile. Attempted to void x3 after anne out this morning and unable to void. Pt st cathed for 700cc at about 1335. Attempted to void x3 and unable to void. Will update G2 per her request and possibly re-insert a anne. C/o pain and medicated with relief. Up with SBA to bathroom. Breast feeding well. Lochia scant and no clots noted. Passing gas and tolerating a regular diet. Will continue to monitor.

## 2019-01-24 NOTE — PROGRESS NOTES
[facility]   Post-partum Note    Name:  Heydi Loza  MRN: [id number]    S: Patient is feeling well this morning.  Pain is controlled.  Denies dizziness, chest pain, SOB, nausea or vomiting. Tolerating regular diet without nausea or vomiting.  Ambulating without dizziness, but she has not ambulated much yet.  Spontaneously voiding. Reports flatus.  Lochia is similar to menses.  Breast feeding..     O:   Patient Vitals for the past 24 hrs:   BP Temp Temp src Pulse Heart Rate Resp SpO2   19 0441 99/51 98.9  F (37.2  C) Oral -- 88 16 --   19 2139 90/54 98.7  F (37.1  C) Oral -- 80 16 --   19 1500 91/52 98.4  F (36.9  C) Oral 85 -- 20 --   19 1230 100/51 98.5  F (36.9  C) Oral 73 -- 18 100 %   19 0748 96/53 99  F (37.2  C) Oral 84 -- 16 99 %     Gen:  Resting comfortably, NAD  CV:       Well perfused  Pulm:    No increased work of breathing  Abd:  Soft, appropriately ttp, non-distended.Fundus below the umbilicus, firm and non-tender.  Incision: c/d/i no surrounding redness or erythema  Ext:  non-tender, 1+ LE edema b/l    I/O last 3 completed shifts:  In: 2400 [P.O.:2400]  Out: 4700 [Urine:4700]    Hgb:   Hemoglobin   Date Value Ref Range Status   2019 10.0 (L) 11.7 - 15.7 g/dL Final       Assessment/Plan:  Heydi Loza is a 38 year old  on POD#2 s/p RLTCS.     - continue with routine postpartum management  Pain:     Well-controlled with PO pain medicatins  Hgb:      12.3>EBL 1358>10.0. Patient is asymptomatic from acute blood loss anemia and vitals are reassuring will discharge home with PO iron if hgb <10  Rh:        Positive, no rhogam needed  Rubella: immune  Feed:    breast  BC:       Undecided   Dispo:   DC POD#3    Herlinda Weiss MD PhD  Ob/Gyn PGY-3  2019 6:50 AM    The patient was seen and examined by me separately from the team.  I have reviewed and agree with the above note.  She noted no concerns today.  Reviewed likely  discharge home tomorrow.    Tiana Mcmahan MD, FACOG

## 2019-01-24 NOTE — PLAN OF CARE
VSS. AFebrile. Breast feeding well. Continues to feel itchy. Will take a shower to see if helps out. Declined benadryl d/t feeling tired all the time from oxycodone. C/o pain and medicated with relief. Up ad angelito and walking to bathroom only. Encouraged walking in hallways and more in the room. [place] here [place]. Had one bout of small emesis this morning. States drank too much apple juice. Encouraged to drink water for now. Tolerating regular diet. IVSL and zofran given for nausea with relief. Attentive to baby's needs. Will continue to monitor.

## 2019-01-25 VITALS
HEART RATE: 82 BPM | HEIGHT: 64 IN | TEMPERATURE: 99.1 F | DIASTOLIC BLOOD PRESSURE: 55 MMHG | RESPIRATION RATE: 16 BRPM | OXYGEN SATURATION: 97 % | SYSTOLIC BLOOD PRESSURE: 91 MMHG | WEIGHT: 206 LBS | BODY MASS INDEX: 35.17 KG/M2

## 2019-01-25 LAB — PLATELET # BLD AUTO: 209 10E9/L (ref 150–450)

## 2019-01-25 PROCEDURE — 25000132 ZZH RX MED GY IP 250 OP 250 PS 637: Performed by: OBSTETRICS & GYNECOLOGY

## 2019-01-25 PROCEDURE — 36415 COLL VENOUS BLD VENIPUNCTURE: CPT | Performed by: STUDENT IN AN ORGANIZED HEALTH CARE EDUCATION/TRAINING PROGRAM

## 2019-01-25 PROCEDURE — 25000132 ZZH RX MED GY IP 250 OP 250 PS 637: Performed by: STUDENT IN AN ORGANIZED HEALTH CARE EDUCATION/TRAINING PROGRAM

## 2019-01-25 PROCEDURE — 85049 AUTOMATED PLATELET COUNT: CPT | Performed by: STUDENT IN AN ORGANIZED HEALTH CARE EDUCATION/TRAINING PROGRAM

## 2019-01-25 RX ADMIN — OXYCODONE HYDROCHLORIDE 10 MG: 5 TABLET ORAL at 02:55

## 2019-01-25 RX ADMIN — IBUPROFEN 800 MG: 800 TABLET, FILM COATED ORAL at 09:21

## 2019-01-25 RX ADMIN — ACETAMINOPHEN 650 MG: 325 TABLET, FILM COATED ORAL at 02:44

## 2019-01-25 RX ADMIN — IBUPROFEN 800 MG: 800 TABLET, FILM COATED ORAL at 02:44

## 2019-01-25 RX ADMIN — SENNOSIDES AND DOCUSATE SODIUM 2 TABLET: 8.6; 5 TABLET ORAL at 09:21

## 2019-01-25 RX ADMIN — ACETAMINOPHEN 650 MG: 325 TABLET, FILM COATED ORAL at 09:21

## 2019-01-25 ASSESSMENT — PAIN DESCRIPTION - DESCRIPTORS: DESCRIPTORS: DISCOMFORT

## 2019-01-25 NOTE — PLAN OF CARE
Data: Vital signs within normal limits except hypotension, provider aware of hypotension. Postpartum checks within normal limits - see flow record. Patient eating and drinking normally. Patient able to empty bladder independently and is up ambulating. No apparent signs of infection. Incision healing well. Patient performing self cares and is able to care for infant. Breastfeeding with assistance in latching baby. Patient supplementing baby with formula per preference.  Action: Pain has been adequately managed with oral pain medications.   Response: Positive attachment behaviors observed with infant. Support person, brother, present.   Plan: Continue with the plan of care.

## 2019-01-25 NOTE — PROVIDER NOTIFICATION
01/24/19 1717   Provider Notification   Provider Name/Title Dr. JARAMILLO (G1)   Method of Notification Phone   Notification Reason Vital Signs Change;Other   Resident okay with patient having Ibuprofen and being on Lovenox. Also updated resident on patients hypotension, patient asymptomatic. Will notify resident if patient becomes symptomatic.

## 2019-01-25 NOTE — PLAN OF CARE
Data: Vital signs stable, assessments within normal limits. Discharge instruction, medication given and instructed of signs/symptoms to look for and report per discharge instructions.   Discharge outcomes on care plan met. No apparent pain. Breast pump given.   Action: Review of care plan, teaching, and discharge instructions done with patient. verification with signature obtained.   Response: patient  states understanding and comfort with self cares. All questions about self care addressed. patient discharged with infant  at 1055.

## 2019-01-25 NOTE — PLAN OF CARE
Sleeping between cares independent with baby cares did ask for assistance with breast feeding but baby was latched when came into room VSS up to BR in room. Passing gas. Comfortable asking for pain medications to keep ahead of pain comfortable with pain management.

## 2019-01-25 NOTE — PROGRESS NOTES
[facility]   Post-partum Note    Name:  Heydi Loza  MRN: [id number]    S: Patient is feeling well this morning.  Pain is controlled.  Denies dizziness, chest pain, SOB, nausea or vomiting. Tolerating regular diet without nausea or vomiting.  Ambulating without dizziness.  Spontaneously voiding. Reports flatus.  Lochia is less than menses.  Breast feeding.      O:   Patient Vitals for the past 24 hrs:   BP Temp Temp src Pulse Resp SpO2   19 2330 94/61 98.6  F (37  C) Oral 82 18 97 %   19 1714 (!) 86/48 -- -- 75 -- --   19 1700 (!) 84/46 98.7  F (37.1  C) Oral 79 16 --   19 0850 93/54 98.5  F (36.9  C) Oral 83 20 --     Gen:      Resting comfortably, NAD  CV:       Well perfused  Pulm:    No increased work of breathing  Abd:      Soft, appropriately ttp, non-distended.Fundus below the umbilicus, firm and non-tender.  Incision: c/d/i no surrounding redness or erythema  Ext:       non-tender, 1+ LE edema b/l        I/O last 3 completed shifts:  In: 900 [P.O.:900]  Out: 800 [Urine:800]    Hgb:   Hemoglobin   Date Value Ref Range Status   2019 10.0 (L) 11.7 - 15.7 g/dL Final       Assessment/Plan:  Heydi Loza is a 38 year old  on POD#3 s/p RLTCS.     - continue with routine postpartum management  Pain:     Well-controlled with PO pain medicatins  Hgb:      12.3>EBL 1358>10.0. Patient is asymptomatic from acute blood loss anemia and vitals are reassuring will discharge home with PO iron if hgb <10  Rh:        Positive, no rhogam needed  Rubella: immune  Feed:    breast  BC:       Undecided   Dispo:   DC today     Herlinda Weiss MD PhD  Ob/Gyn PGY-3  2019 6:51 AM    [facility] staff:  Appreciate note by Dr. Weiss.  I have seen and examined the patient without the resident. I have reviewed, edited, and agree with the note.      Beatriz Naqvi MD, FACOG  2019  8:46 AM

## 2019-01-29 ENCOUNTER — HOSPITAL ENCOUNTER (EMERGENCY)
Facility: CLINIC | Age: 39
Discharge: HOME | End: 2019-01-29
Attending: EMERGENCY MEDICINE | Admitting: EMERGENCY MEDICINE
Payer: COMMERCIAL

## 2019-01-29 ENCOUNTER — APPOINTMENT (OUTPATIENT)
Dept: CT IMAGING | Facility: CLINIC | Age: 39
End: 2019-01-29
Payer: COMMERCIAL

## 2019-01-29 VITALS
TEMPERATURE: 98.2 F | SYSTOLIC BLOOD PRESSURE: 131 MMHG | OXYGEN SATURATION: 99 % | HEART RATE: 64 BPM | BODY MASS INDEX: 35.87 KG/M2 | RESPIRATION RATE: 18 BRPM | DIASTOLIC BLOOD PRESSURE: 83 MMHG | WEIGHT: 209 LBS

## 2019-01-29 DIAGNOSIS — M54.50 BILATERAL LOW BACK PAIN WITHOUT SCIATICA, UNSPECIFIED CHRONICITY: ICD-10-CM

## 2019-01-29 DIAGNOSIS — R07.89 OTHER CHEST PAIN: ICD-10-CM

## 2019-01-29 LAB
APTT PPP: 29 SEC (ref 22–37)
INR PPP: 1.07 (ref 0.86–1.14)

## 2019-01-29 PROCEDURE — 93308 TTE F-UP OR LMTD: CPT

## 2019-01-29 PROCEDURE — 93010 ELECTROCARDIOGRAM REPORT: CPT | Mod: 59 | Performed by: EMERGENCY MEDICINE

## 2019-01-29 PROCEDURE — 85730 THROMBOPLASTIN TIME PARTIAL: CPT | Performed by: EMERGENCY MEDICINE

## 2019-01-29 PROCEDURE — 85610 PROTHROMBIN TIME: CPT | Performed by: EMERGENCY MEDICINE

## 2019-01-29 PROCEDURE — 72131 CT LUMBAR SPINE W/O DYE: CPT

## 2019-01-29 PROCEDURE — 25000132 ZZH RX MED GY IP 250 OP 250 PS 637: Performed by: EMERGENCY MEDICINE

## 2019-01-29 PROCEDURE — 93005 ELECTROCARDIOGRAM TRACING: CPT

## 2019-01-29 PROCEDURE — 99285 EMERGENCY DEPT VISIT HI MDM: CPT | Mod: 25

## 2019-01-29 PROCEDURE — 99285 EMERGENCY DEPT VISIT HI MDM: CPT | Mod: 25 | Performed by: EMERGENCY MEDICINE

## 2019-01-29 PROCEDURE — 93308 TTE F-UP OR LMTD: CPT | Mod: 26 | Performed by: EMERGENCY MEDICINE

## 2019-01-29 RX ORDER — ACETAMINOPHEN 325 MG/1
650 TABLET ORAL EVERY 4 HOURS PRN
Status: DISCONTINUED | OUTPATIENT
Start: 2019-01-29 | End: 2019-01-30 | Stop reason: HOSPADM

## 2019-01-29 RX ORDER — IBUPROFEN 600 MG/1
600 TABLET, FILM COATED ORAL ONCE
Status: COMPLETED | OUTPATIENT
Start: 2019-01-29 | End: 2019-01-29

## 2019-01-29 RX ORDER — LIDOCAINE 4 G/G
1 PATCH TOPICAL ONCE
Status: DISCONTINUED | OUTPATIENT
Start: 2019-01-29 | End: 2019-01-30 | Stop reason: HOSPADM

## 2019-01-29 RX ADMIN — LIDOCAINE 1 PATCH: 560 PATCH PERCUTANEOUS; TOPICAL; TRANSDERMAL at 22:22

## 2019-01-29 RX ADMIN — IBUPROFEN 600 MG: 600 TABLET ORAL at 21:11

## 2019-01-29 ASSESSMENT — ENCOUNTER SYMPTOMS
HEADACHES: 0
BACK PAIN: 1
VOMITING: 0
WOUND: 1
NAUSEA: 0
NUMBNESS: 0
COUGH: 0
SHORTNESS OF BREATH: 0
PALPITATIONS: 1
FEVER: 0
DIFFICULTY URINATING: 0
WEAKNESS: 0

## 2019-01-29 NOTE — ED AVS SNAPSHOT
[facility], Emergency Department  [address]  [place] [address]  Phone:  [phone]  Fax:  [phone]                                    Heydi Loza   MRN: [id number]    Department:  [facility], Emergency Department   Date of Visit:  1/29/2019           After Visit Summary Signature Page    I have received my discharge instructions, and my questions have been answered. I have discussed any challenges I see with this plan with the nurse or doctor.    ..........................................................................................................................................  Patient/Patient Representative Signature      ..........................................................................................................................................  Patient Representative Print Name and Relationship to Patient    ..................................................               ................................................  Date                                   Time    ..........................................................................................................................................  Reviewed by Signature/Title    ...................................................              ..............................................  Date                                               Time          22EPIC Rev 08/18

## 2019-01-30 LAB — INTERPRETATION ECG - MUSE: NORMAL

## 2019-01-30 NOTE — ED PROVIDER NOTES
[facility] EMERGENCY DEPARTMENT ([facility])     2019    History     Chief Complaint   Patient presents with     Chest Pain     Palpations 2 days     HPI  Heydi Loza is a 38 year old female who is 1 week postpartum  (19) without any complications who presents to the ED for 2 days of ongoing palpitations.  Patient states that she has been having intermittent episodes of palpitations that lasted approximately 5 minutes. She denies any chest pain or shortness of breath with this.  She also complains of 3 days of ongoing, constant low back pain which she has been taking ibuprofen and Tylenol for without good relief.  She states that she last took Tylenol and ibuprofen at around 1:30 PM today.  She otherwise currently denies any nausea, numbness or weakness, headache, loss of consciousness, vision changes, previous cardiac history, previous history of DVT/PE, loss of bowel or bladder control, numbness after wiping, previous history of back surgeries, fever, or cough.    PAST MEDICAL HISTORY  Past Medical History:   Diagnosis Date     GBS (group B Streptococcus carrier), +RV culture, currently pregnant 8/10/2011     IUD -- Mirena 10/28/2011    10/10/13 Removed via hysteroscope in office w/o difficulty. Strings NOT visible extending from external cervical os.  US documenting intrauterine IUD obtained 2011 and 2013.        Menarche age 18    cycles 28-30 days x 5 days     Postpartum depression 2011     Sprain of ankle, unspecified site 2013     Varicosities     Hemorrhoids     PAST SURGICAL HISTORY  Past Surgical History:   Procedure Laterality Date      SECTION  2011    Procedure: SECTION; Surgeon:ANDRES LU; Location:[facility] L+D      SECTION N/A 2015    Procedure:  SECTION;  Surgeon: Shalonda Rosales MD;  Location: [facility] L+D      SECTION N/A 2019    Procedure: Repeat  Section;  Surgeon:  Tiana Mcmahan MD;  Location: [facility] L+D     FAMILY HISTORY  Family History   Problem Relation Age of Onset     Family History Negative No family hx of      SOCIAL HISTORY  Social History     Tobacco Use     Smoking status: Never Smoker     Smokeless tobacco: Never Used   Substance Use Topics     Alcohol use: No     Alcohol/week: 0.0 oz     MEDICATIONS  No current facility-administered medications for this encounter.      Current Outpatient Medications   Medication     acetaminophen (TYLENOL) 325 MG tablet     ferrous gluconate (FERGON) 324 (38 Fe) MG tablet     ibuprofen (ADVIL/MOTRIN) 800 MG tablet     senna-docusate (SENOKOT-S/PERICOLACE) 8.6-50 MG tablet     Prenatal Vit-Fe Fumarate-FA (PRENATAL MULTIVITAMIN W/IRON) 27-0.8 MG tablet     Vitamin D, Cholecalciferol, 1000 units TABS     Facility-Administered Medications Ordered in Other Encounters   Medication     bupivacaine 0.25 % - EPINEPHrine 1:200,000 injection     ALLERGIES  Allergies   Allergen Reactions     Nkda [No Known Drug Allergies]        I have reviewed the Medications, Allergies, Past Medical and Surgical History, and Social History in the Epic system.    Review of Systems   Constitutional: Negative for fever.   HENT: Negative for congestion.    Eyes: Negative for visual disturbance.   Respiratory: Negative for cough and shortness of breath.    Cardiovascular: Positive for palpitations. Negative for chest pain.   Gastrointestinal: Negative for nausea and vomiting.   Genitourinary: Negative for difficulty urinating.   Musculoskeletal: Positive for back pain (low back).   Skin: Positive for wound (hematoma around epidural site).   Neurological: Negative for weakness, numbness and headaches.       Physical Exam   BP: 131/83  Pulse: 64  Temp: 98.2  F (36.8  C)  Resp: 18  Weight: 94.8 kg (209 lb)  SpO2: 99 %      Physical Exam   Constitutional: She is oriented to person, place, and time. She appears well-developed and well-nourished.   HENT:    Head: Normocephalic and atraumatic.   Mouth/Throat: Oropharynx is clear and moist.   Eyes: Conjunctivae and EOM are normal. Pupils are equal, round, and reactive to light.   Cardiovascular: Normal rate, regular rhythm, normal heart sounds and intact distal pulses.   No murmur heard.  Pulmonary/Chest: Effort normal and breath sounds normal. No respiratory distress.   Abdominal: Soft. There is no tenderness.   Musculoskeletal: Normal range of motion. She exhibits no edema.   Area of slight swelling and tenderness over L5/S1 area.  No redness or skin breakdown.   Neurological: She is alert and oriented to person, place, and time.   Skin: Skin is warm and dry.       ED Course        Procedures   7:35 PM  The patient was seen and examined by Dr. Johnson in Room 20.                EKG Interpretation:      Interpreted by Tatianna Johnson  Time reviewed:   Symptoms at time of EKG: None   Rhythm: normal sinus   Rate: Normal  Axis: Normal  Ectopy: none  Conduction: normal  ST Segments/ T Waves: T wave inversion III, V1 and V2  Q Waves: none  Comparison to prior: No old EKG available    Clinical Impression: no ST elevations/depressions.  Nonspecific T wave inversions                Critical Care time:  none             Labs Ordered and Resulted from Time of ED Arrival Up to the Time of Departure from the ED - No data to display         Assessments & Plan (with Medical Decision Making)   Ms. Loza is a 38 year old woman 1 week s/p  who presents with palpitations and back pain.  DDX for palpitations includes arrhythmia, dehydration, anxiety, PE, pericarditis, ACS, post-partum cardiomyopathy, electrolyte abnormality, blood loss anemia.  There is no evidence of arrhythmia on EKG or on monitoring, and no evidence of ischemia.  Bedside echo did not show pericardial effusion or dilated chambers, overall excellent LV function, which lowers my suspicion for cardiomyopathy and pericarditis.  Though she is recently  post-op she has no objective findings of PE (leg swelling, tachycardia) and no history of clotting disorders, and she has no dyspnea or chest pain.  She was also anticoagulated with lovenox while in the hospital and is now walking.  Will obtain electrolytes and blood count.  She appears to have a painful hematoma on her back possibly in the area of the epidural.  She has no postural headache to indicate CSF leak, or neurological symptoms to suggest spinal cord damage. Will obtain CT to assess for depth of hematoma.  Will give pain control and reassess.    Progress note:  Patient with improvement of pain.  CT shows dependent edema to back, no other findings. Reviewed with patient and she admits she has not been as active as she should after the surgery.  Discussed light activity as this will likely prevent worsening of MSK back pain, as well as staggering tylenol and ibuprofen, lidoderm patches, and supportive care such as ice/heat and stretching. She has no apparent life threatening cause of pain and palpitations at this time however return instructions were provided (chest pain, dyspnea, fever, etc) and patient expressed understanding.    Tatianna Johnson MD on 1/30/2019 at 02:45 AM    I have reviewed the nursing notes.    I have reviewed the findings, diagnosis, plan and need for follow up with the patient.       Medication List      ASK your doctor about these medications    oxyCODONE 5 MG tablet  Commonly known as:  ROXICODONE  5 mg, Oral, EVERY 6 HOURS PRN  Ask about: Should I take this medication?            Final diagnoses:   None      I, Gustavo Cisneros, am serving as a trained medical scribe to document services personally performed by Tatianna Johnson MD, based on the provider's statements to me.      ITatianna MD, was physically present and have reviewed and verified the accuracy of this note documented by Gustavo Cisneros.     1/29/2019   [facility], [place], EMERGENCY DEPARTMENT     Tatianna Johnson,  MD  01/30/19 2020

## 2019-01-30 NOTE — DISCHARGE INSTRUCTIONS
Tylenol 650 mg every 6 hours as needed for pain.  Do not take more than 3250 mg per day   Ibuprofen 600 mg every 6 hours as needed for pain with food and plenty of water  Apply ice wrapped in a towel for 10 minutes up to 5 times a day as needed for pain  You may also apply warm compresses if this feels better  You may use Lidoderm patches over-the-counter.  Do not apply heat over these.  Wash your hands after use.  Do not apply for more than 12 hours at a time and leave your skin without a patch for 12 hours between each patch  Avoid prolonged laying down.  Try to walk around and do some gentle stretching during the day  Return to the emergency department for any new or worsening symptoms including but not limited to fever, numbness, difficulty walking, severe pain

## 2019-05-13 ENCOUNTER — HOSPITAL ENCOUNTER (OUTPATIENT)
Dept: ULTRASOUND IMAGING | Facility: CLINIC | Age: 39
Discharge: HOME | End: 2019-05-13
Attending: ADVANCED PRACTICE MIDWIFE | Admitting: ADVANCED PRACTICE MIDWIFE
Payer: COMMERCIAL

## 2019-05-13 DIAGNOSIS — R10.30 LOWER ABDOMINAL PAIN: ICD-10-CM

## 2019-05-13 PROCEDURE — 76705 ECHO EXAM OF ABDOMEN: CPT

## 2019-08-12 ENCOUNTER — HOSPITAL ENCOUNTER (EMERGENCY)
Facility: CLINIC | Age: 39
Discharge: HOME | End: 2019-08-12
Attending: EMERGENCY MEDICINE | Admitting: EMERGENCY MEDICINE
Payer: OTHER MISCELLANEOUS

## 2019-08-12 VITALS
RESPIRATION RATE: 16 BRPM | DIASTOLIC BLOOD PRESSURE: 56 MMHG | WEIGHT: 192.8 LBS | OXYGEN SATURATION: 99 % | SYSTOLIC BLOOD PRESSURE: 98 MMHG | TEMPERATURE: 98.7 F | BODY MASS INDEX: 33.09 KG/M2

## 2019-08-12 DIAGNOSIS — S00.03XA CONTUSION OF FACE, SCALP AND NECK, INITIAL ENCOUNTER: ICD-10-CM

## 2019-08-12 DIAGNOSIS — S00.83XA CONTUSION OF FACE, SCALP AND NECK, INITIAL ENCOUNTER: ICD-10-CM

## 2019-08-12 DIAGNOSIS — S10.93XA CONTUSION OF FACE, SCALP AND NECK, INITIAL ENCOUNTER: ICD-10-CM

## 2019-08-12 PROCEDURE — 99283 EMERGENCY DEPT VISIT LOW MDM: CPT | Performed by: EMERGENCY MEDICINE

## 2019-08-12 PROCEDURE — 99284 EMERGENCY DEPT VISIT MOD MDM: CPT | Mod: Z6 | Performed by: EMERGENCY MEDICINE

## 2019-08-12 RX ORDER — IBUPROFEN 600 MG/1
600 TABLET, FILM COATED ORAL EVERY 6 HOURS PRN
Qty: 20 TABLET | Refills: 0 | Status: SHIPPED | OUTPATIENT
Start: 2019-08-12 | End: 2024-03-31

## 2019-08-12 ASSESSMENT — ENCOUNTER SYMPTOMS
SHORTNESS OF BREATH: 0
HEADACHES: 1
ARTHRALGIAS: 0
COLOR CHANGE: 0
ABDOMINAL PAIN: 0
FEVER: 0
NECK STIFFNESS: 0
EYE REDNESS: 0
DIFFICULTY URINATING: 0
CONFUSION: 0

## 2019-08-12 NOTE — ED AVS SNAPSHOT
[facility], Emergency Department  [address]  [place] [address]  Phone:  [phone]  Fax:  [phone]                                    Heydi Loza   MRN: [id number]    Department:  [facility], Emergency Department   Date of Visit:  8/12/2019           After Visit Summary Signature Page    I have received my discharge instructions, and my questions have been answered. I have discussed any challenges I see with this plan with the nurse or doctor.    ..........................................................................................................................................  Patient/Patient Representative Signature      ..........................................................................................................................................  Patient Representative Print Name and Relationship to Patient    ..................................................               ................................................  Date                                   Time    ..........................................................................................................................................  Reviewed by Signature/Title    ...................................................              ..............................................  Date                                               Time          22EPIC Rev 08/18
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
## 2021-10-30 ENCOUNTER — HOSPITAL ENCOUNTER (EMERGENCY)
Facility: CLINIC | Age: 41
Discharge: HOME | End: 2021-10-30
Attending: PHYSICIAN ASSISTANT | Admitting: PHYSICIAN ASSISTANT
Payer: COMMERCIAL

## 2021-10-30 VITALS
OXYGEN SATURATION: 99 % | TEMPERATURE: 98.8 F | RESPIRATION RATE: 16 BRPM | SYSTOLIC BLOOD PRESSURE: 106 MMHG | HEART RATE: 79 BPM | DIASTOLIC BLOOD PRESSURE: 62 MMHG

## 2021-10-30 DIAGNOSIS — J06.9 URI (UPPER RESPIRATORY INFECTION): ICD-10-CM

## 2021-10-30 DIAGNOSIS — J02.9 ACUTE PHARYNGITIS, UNSPECIFIED ETIOLOGY: ICD-10-CM

## 2021-10-30 LAB — DEPRECATED S PYO AG THROAT QL EIA: NEGATIVE

## 2021-10-30 PROCEDURE — 99283 EMERGENCY DEPT VISIT LOW MDM: CPT

## 2021-10-30 PROCEDURE — 87651 STREP A DNA AMP PROBE: CPT | Performed by: PHYSICIAN ASSISTANT

## 2021-10-30 PROCEDURE — 250N000013 HC RX MED GY IP 250 OP 250 PS 637: Performed by: PHYSICIAN ASSISTANT

## 2021-10-30 RX ORDER — IBUPROFEN 600 MG/1
600 TABLET, FILM COATED ORAL ONCE
Status: COMPLETED | OUTPATIENT
Start: 2021-10-30 | End: 2021-10-30

## 2021-10-30 RX ORDER — PREDNISOLONE 15 MG/5 ML
20 SOLUTION, ORAL ORAL DAILY
Qty: 20 ML | Refills: 0 | Status: SHIPPED | OUTPATIENT
Start: 2021-10-30 | End: 2021-11-02

## 2021-10-30 RX ADMIN — IBUPROFEN 600 MG: 600 TABLET, FILM COATED ORAL at 21:13

## 2021-10-30 ASSESSMENT — ENCOUNTER SYMPTOMS
SLEEP DISTURBANCE: 1
SORE THROAT: 1
TROUBLE SWALLOWING: 0
COUGH: 1

## 2021-10-31 LAB — GROUP A STREP BY PCR: NOT DETECTED

## 2021-10-31 NOTE — ED TRIAGE NOTES
Sore throat and hoarse voice that started 1 week ago.  Symptoms have been much worse over last 2 days.

## 2021-10-31 NOTE — ED PROVIDER NOTES
History   Chief Complaint:  Pharyngitis       HPI   Heydi Loza is a 41 year old female who presents with pharyngitis and bilateral ear itching that started last night, keeping her from sleep. She also notes intermittent congestion and cough. Notes that she is able to eat and drink. Has not taken any medication for her symptoms. Denies other associated symptoms.     Review of Systems   HENT: Positive for congestion and sore throat. Negative for trouble swallowing.    Respiratory: Positive for cough.    Psychiatric/Behavioral: Positive for sleep disturbance.   All other systems reviewed and are negative.      Allergies:  The patient has no known allergies.     Medications:  The patient is not currently taking any prescribed medications.    Past Medical History:     GBS  Postpartum depression  Adjustment reaction with anxiety and depression  Sinus tarsi syndrome  Injury to dome of bladder  Female circumcision  Hepatitis C antibody positive  Anomie    Past Surgical History:     section x3    Social History:  The patient presents to the ED alone.     Physical Exam     Patient Vitals for the past 24 hrs:   BP Temp Temp src Pulse Resp SpO2   10/30/21 2102 123/73 98.4  F (36.9  C) Temporal 82 16 99 %       Physical Exam  Constitutional: Pleasant. Cooperative.   Eyes: Pupils equally round and reactive  HENT: Head is normal in appearance. TMs normal. Moist mucous membranes. Mild oropharyngeal erythema. No exudates. No palatal asymmetry. Uvula midline. No trismus. No muffled voice. Tolerating their secretions.  Cardiovascular: Regular rate and rhythm.  Respiratory: Normal respiratory effort, lungs are clear bilaterally.  Neck: Normal ROM. No preauricular, postauricular, tonsillar, submandibular, submental, or cervical lymphadenopathy.   Skin: Normal, without rash.  Neurologic: Cranial nerves grossly intact. Alert.  Nursing notes and vital signs reviewed.    Emergency Department Course     Laboratory:  Labs  Ordered and Resulted from Time of ED Arrival to Time of ED Departure   STREPTOCOCCUS A RAPID SCREEN W REFELX TO PCR - Normal       Result Value    Group A Strep antigen Negative     GROUP A STREPTOCOCCUS PCR THROAT SWAB      Emergency Department Course:  Reviewed:  I reviewed nursing notes, vitals, past medical history and Care Everywhere    Assessments:  2108 I obtained history and examined the patient as noted above.     2155 I rechecked the patient and explained findings.     Interventions:  2113 Advil 600 mg PO    Disposition:  The patient was discharged to home.     Impression & Plan     Medical Decision Making:  Heydi Loza is a 41 year old female who presents to the ED for evaluation of pharyngitis and bilateral ear itching.  The patient does note she has had a little bit of congestion and  scant cough as well.  See HPI as above for additional details.  Vitals and physical exam as above.  DDx was broad include viral URI, strep pharyngitis, epiglottitis, deep space infection, PTA, Mata's angina, among others.  PTA. Strep swab as above negative.  Patient is well-appearing.  Low suspicion for remainder of differential at this time.  No evidence for PTA. Discussed Tylenol and ibuprofen. After discussion, will send patient home with very short course of prednisolone. Advised f/u with PCP with persistent symptoms. Discussed reasons to return. All questions answered. Patient discharged to home in stable condition.    Diagnosis:    ICD-10-CM    1. Acute pharyngitis, unspecified etiology  J02.9    2. URI (upper respiratory infection)  J06.9      Discharge medications:  Prednisolone    Scribe Disclosure:  I, Rajiv Rivas, am serving as a scribe at 9:05 PM on 10/30/2021 to document services personally performed by Santos Hutchinson PA-C based on my observations and the provider's statements to me.     This record was created at least in part using electronic voice recognition software, so please excuse any  typographical errors.              Santos Hutchinson PA-C  10/30/21 0796

## 2022-05-28 ENCOUNTER — APPOINTMENT (OUTPATIENT)
Dept: GENERAL RADIOLOGY | Facility: CLINIC | Age: 42
End: 2022-05-28
Attending: PHYSICIAN ASSISTANT
Payer: COMMERCIAL

## 2022-05-28 ENCOUNTER — HOSPITAL ENCOUNTER (EMERGENCY)
Facility: CLINIC | Age: 42
Discharge: HOME | End: 2022-05-28
Attending: PHYSICIAN ASSISTANT | Admitting: PHYSICIAN ASSISTANT
Payer: COMMERCIAL

## 2022-05-28 VITALS
DIASTOLIC BLOOD PRESSURE: 69 MMHG | OXYGEN SATURATION: 99 % | SYSTOLIC BLOOD PRESSURE: 123 MMHG | RESPIRATION RATE: 18 BRPM | TEMPERATURE: 97.3 F | HEART RATE: 58 BPM

## 2022-05-28 DIAGNOSIS — U07.1 INFECTION DUE TO 2019 NOVEL CORONAVIRUS: ICD-10-CM

## 2022-05-28 LAB
CREAT BLD-MCNC: 0.5 MG/DL (ref 0.5–1)
FLUAV RNA SPEC QL NAA+PROBE: NEGATIVE
FLUBV RNA RESP QL NAA+PROBE: NEGATIVE
GFR SERPL CREATININE-BSD FRML MDRD: >60 ML/MIN/1.73M2
RSV RNA SPEC NAA+PROBE: NEGATIVE
SARS-COV-2 RNA RESP QL NAA+PROBE: POSITIVE

## 2022-05-28 PROCEDURE — 71046 X-RAY EXAM CHEST 2 VIEWS: CPT

## 2022-05-28 PROCEDURE — 82565 ASSAY OF CREATININE: CPT

## 2022-05-28 PROCEDURE — 87637 SARSCOV2&INF A&B&RSV AMP PRB: CPT | Performed by: PHYSICIAN ASSISTANT

## 2022-05-28 PROCEDURE — 99284 EMERGENCY DEPT VISIT MOD MDM: CPT | Mod: CS,25

## 2022-05-28 PROCEDURE — 99283 EMERGENCY DEPT VISIT LOW MDM: CPT | Mod: CS

## 2022-05-28 PROCEDURE — C9803 HOPD COVID-19 SPEC COLLECT: HCPCS

## 2022-05-28 ASSESSMENT — ENCOUNTER SYMPTOMS
COUGH: 1
SHORTNESS OF BREATH: 0

## 2022-05-28 NOTE — ED NOTES
Medical Decision Making for Paxlovid Prescribing     Basic Criteria     Patient has a positive COVID test (antigen or molecular, during this visit or at home): Yes    Patient is within five days of symptom onset (symptom onset day zero): Yes    Patient has symptomatic, mild to moderate COVID not requiring hospitalization or oxygen: Yes     Patient is at least 12 years of age and 40 kg     Higher risk for progression of disease     This patient is eligible for anti-viral treatment due to:     Age >65     Cancer     Cerebrovascular disease     Chronic kidney disease     Chronic lung diseases limited to:   o Interstitial lung disease  o Pulmonary embolism  o Pulmonary hypertension  o Bronchiectasis  o COPD (chronic obstructive pulmonary disease)    Chronic liver diseases limited to:  o Cirrhosis  o Non-alcoholic fatty liver disease  o Alcoholic liver disease  o Autoimmune hepatitis    Cystic fibrosis    Diabetes mellitus, type 1 and type 2    Disabilities  o Attention-Deficit/Hyperactivity Disorder (ADHD)  o Cerebral Palsy  o Congenital Malformations (Birth Defects)  o Limitations with self-care or activities of daily living  o Intellectual and Developmental Disabilities  o Learning Disabilities  o Spinal Cord Injuries     Heart conditions (such as heart failure, coronary artery disease, or cardiomyopathies)    HIV (human immunodeficiency virus)    Mental health disorders limited to:  o Mood disorders, including depression  o Schizophrenia spectrum disorders    Neurologic conditions limited to dementia    Obesity or overweight (BMI ?25 kg/m2)    Primary Immunodeficiencies    Pregnancy and recent pregnancy    Physical inactivity    Sickle Cell Disease    Smoking, current and former    Solid organ or hematopoietic cell transplantation    Substance use disorders    Thalassemia    Tuberculosis     Use of corticosteroids or other immunosuppressive medications    Pregnancy  This patient is of child-bearing potential: Yes  If  yes:    Pregnancy test considered (Paxlovid is authorized for use in pregnancy and recommended by some authorities but positive pregnancy test prompts more shared-decision making)    If patient is on oral contraceptive, additional barrier method advised given potential effect from Paxlovid on contraceptive efficacy    Dosing  GFR Estimate   Date Value Ref Range Status   01/22/2019 >90 >60 mL/min/[1.73_m2] Final     Comment:     Non  GFR Calc  Starting 12/18/2018, serum creatinine based estimated GFR (eGFR) will be   calculated using the Chronic Kidney Disease Epidemiology Collaboration   (CKD-EPI) equation.       GFR, ESTIMATED POCT   Date Value Ref Range Status   05/28/2022 >60 >60 mL/min/1.73m2 Final     GFR Estimate If Black   Date Value Ref Range Status   01/22/2019 >90 >60 mL/min/[1.73_m2] Final     Comment:      GFR Calc  Starting 12/18/2018, serum creatinine based estimated GFR (eGFR) will be   calculated using the Chronic Kidney Disease Epidemiology Collaboration   (CKD-EPI) equation.       (serum creatinine within six months)    GFR >60, no adjustment  o 300 mg nirmatrelvir (two 150 mg tablets) with 100 mg ritonavir one tablet, all three tablets taken together twice daily for 5 days    GFR 30-60, adjustment indicated  o 150 mg nirmatrelvir one tablet with 100 mg ritonavir one tablet, both tablets taken together twice daily for 5 days    GFR <30, not candidate for treatment with Paxlovid    Contraindications    Severe liver disease    Severe renal impairment (GFR <30 ml/min)    Allergy to nirmatrelvir or ritonavir    Drug interaction that cannot be resolved        Drug-drug Interactions  Medications reviewed: Yes    [email or web address]    The following drugs are a contraindication: None  The following drugs will be held for eight days after consideration of risk/benefit: None  The following drugs will have to be held or have dose adjustment after  consideration of risk/benefit: None   Given complexity, patient was instructed to contact primary provider or specialist     Medication location  [email or web address]    Shared-decision making  Medication has FDA EUA for this patient (acute symptomatic mild-moderate COVID infection with risk for progression)   Studies show 88% reduction in hospitalization and death   Adverse effects include altered taste, diarrhea, hypertension, myalgia   Pregnancy and lactation are not considered contraindications to prescribing   Discussed multitude of drug-drug interactions and encouraged review with pharmacist        Guilherme West PA-C  05/28/22 [id number]

## 2022-05-28 NOTE — ED TRIAGE NOTES
Pt presents to ED with cough. States its been going on for 3 days. While working out was coughing so hard she could not catch her breath. Denies fever, SOB. Non productive. Daughter sick with similar symptoms prior to this, but is improving.

## 2022-05-28 NOTE — ED PROVIDER NOTES
History   Chief Complaint:  Cough       HPI   Heydi Loza is a 41 year old female who presents with cough. The patient reports to have cough and congestion for about 3 days despite being vaccinated but no booster. Denies chest pain, shortness of breath, hemoptysis, leg swelling.     Of note, the patient has no medication history.       Review of Systems   HENT: Positive for congestion.    Respiratory: Positive for cough. Negative for shortness of breath.    Cardiovascular: Negative for chest pain and leg swelling.   All other systems reviewed and are negative.      Allergies:  The patient has no known allergies.     Medications:  The patient denies taking prescribed medications.    Past Medical History:     GBS  IUD  Menarche  Postpartum depression  Varicosities    Past Surgical History:    C section x3     Social History:  Presents alone.     Physical Exam     Patient Vitals for the past 24 hrs:   BP Temp Temp src Pulse Resp SpO2   05/28/22 1444 123/69 97.3  F (36.3  C) Temporal 58 18 99 %       Physical Exam  General: Awake, alert, non-toxic.  Head:  Scalp is NC/AT  Eyes:  Conjunctiva normal, PERRL  ENT:  The external nose and ears are normal.     The oropharynx is normal and without erythema or tonsillar swelling. Uvula midline, no submandibular swelling, sublingual swelling, trismus.    Neck:  Normal range of motion without rigidity.  CV:  Regular rate and rhythm    No pathologic murmur, rubs, or gallops.  Resp:  Breath sounds are clear bilaterally    Non-labored, no retractions or accessory muscle use  Abdomen: Abdomen is soft, no distension, no tenderness, no masses.  MS:  No lower extremity edema or asymmetric calf swelling  Skin:  Warm and dry, No rash or lesions noted.  Neuro: Alert and oriented.  GCS 15 No gross motor deficits.  No facial asymmetry.  Psych:  Awake. Alert. Normal affect. Appropriate interactions.    Emergency Department Course     Imaging:  Chest XR,  PA & LAT   Final Result    IMPRESSION: Normal two views of the chest.           Report per radiology    Laboratory:  Labs Ordered and Resulted from Time of ED Arrival to Time of ED Departure   INFLUENZA A/B & SARS-COV2 PCR MULTIPLEX - Abnormal       Result Value    Influenza A PCR Negative      Influenza B PCR Negative      RSV PCR Negative      SARS CoV2 PCR Positive (*)    ISTAT CREATININE POCT - Normal    Creatinine POCT 0.5      GFR, ESTIMATED POCT >60          Emergency Department Course:       Reviewed:  I reviewed nursing notes, vitals, past medical history and Care Everywhere    Assessments:  1630 I obtained history and examined the patient as noted above.   1710 I rechecked the patient and explained findings.     Disposition:  The patient was discharged to home.     Impression & Plan     Medical Decision Making:   Heydi Loza is a 41 year old female who presents for evaluation of URI symptoms. Patient tested positive for COVID-19 in the emergency department. Overall She looks well and I see no evidence of bacterial pneumonia, myocarditis or acute CHF nor impending respiratory failure. Doubt pulmonary embolism or ACS. Doubt serious bacterial infection and I would not do basic labs nor blood cultures. No respiratory changes noted  on detailed exam and I am reassured by their lung exam; they understand this may rapidly change and to return here if they develop fevers more than 102 or progressive respiratory distress.  Chest xray clear.  Discussed self-isolation and close follow up of primary by phone if possible due to outbreak.  Does have some risk factors with MAB PSS score of 4 and is within the window for Paxlovid, no contraindications, not on any medications and would like to begin.  Prescription sent to pharmacy.    Diagnosis:    ICD-10-CM    1. Infection due to 2019 novel coronavirus  U07.1        Discharge Medications:  New Prescriptions    NIRMATRELVIR AND RITONAVIR (PAXLOVID) THERAPY PACK    Take 3 tablets by mouth 2  times daily for 5 days Take 2 Nirmatrelvir tablets and 1 Ritonavir tablet twice daily for 5 days.       Scribe Disclosure:  We, Sushant Chung and Radha Mo, are serving as a scribe at 3:16 PM on 5/28/2022 to document services personally performed by Guilherme West PA-C based on my observations and the provider's statements to me.          Guilherme West PA-C  05/28/22 [id number]

## 2022-08-21 ENCOUNTER — HOSPITAL ENCOUNTER (EMERGENCY)
Facility: CLINIC | Age: 42
Discharge: HOME | End: 2022-08-21
Attending: EMERGENCY MEDICINE | Admitting: EMERGENCY MEDICINE
Payer: COMMERCIAL

## 2022-08-21 ENCOUNTER — APPOINTMENT (OUTPATIENT)
Dept: GENERAL RADIOLOGY | Facility: CLINIC | Age: 42
End: 2022-08-21
Attending: EMERGENCY MEDICINE
Payer: COMMERCIAL

## 2022-08-21 VITALS
OXYGEN SATURATION: 100 % | RESPIRATION RATE: 16 BRPM | TEMPERATURE: 99.6 F | DIASTOLIC BLOOD PRESSURE: 79 MMHG | SYSTOLIC BLOOD PRESSURE: 112 MMHG | HEART RATE: 71 BPM

## 2022-08-21 DIAGNOSIS — R51.9 NONINTRACTABLE HEADACHE, UNSPECIFIED CHRONICITY PATTERN, UNSPECIFIED HEADACHE TYPE: ICD-10-CM

## 2022-08-21 DIAGNOSIS — R07.89 ATYPICAL CHEST PAIN: ICD-10-CM

## 2022-08-21 LAB
ALBUMIN SERPL BCG-MCNC: 4.2 G/DL (ref 3.5–5.2)
ALP SERPL-CCNC: 56 U/L (ref 35–104)
ALT SERPL W P-5'-P-CCNC: 13 U/L (ref 10–35)
ANION GAP SERPL CALCULATED.3IONS-SCNC: 11 MMOL/L (ref 7–15)
AST SERPL W P-5'-P-CCNC: 21 U/L (ref 10–35)
BASOPHILS # BLD AUTO: 0 10E3/UL (ref 0–0.2)
BASOPHILS NFR BLD AUTO: 1 %
BILIRUB SERPL-MCNC: 0.2 MG/DL
BUN SERPL-MCNC: 5.9 MG/DL (ref 6–20)
CALCIUM SERPL-MCNC: 9 MG/DL (ref 8.6–10)
CHLORIDE SERPL-SCNC: 104 MMOL/L (ref 98–107)
CREAT SERPL-MCNC: 0.65 MG/DL (ref 0.51–0.95)
DEPRECATED HCO3 PLAS-SCNC: 22 MMOL/L (ref 22–29)
EOSINOPHIL # BLD AUTO: 0.1 10E3/UL (ref 0–0.7)
EOSINOPHIL NFR BLD AUTO: 3 %
ERYTHROCYTE [DISTWIDTH] IN BLOOD BY AUTOMATED COUNT: 16.5 % (ref 10–15)
FLUAV RNA SPEC QL NAA+PROBE: NEGATIVE
FLUBV RNA RESP QL NAA+PROBE: NEGATIVE
GFR SERPL CREATININE-BSD FRML MDRD: >90 ML/MIN/1.73M2
GLUCOSE SERPL-MCNC: 95 MG/DL (ref 70–99)
HCG SERPL QL: NEGATIVE
HCT VFR BLD AUTO: 33 % (ref 35–47)
HGB BLD-MCNC: 9.8 G/DL (ref 11.7–15.7)
IMM GRANULOCYTES # BLD: 0 10E3/UL
IMM GRANULOCYTES NFR BLD: 0 %
LIPASE SERPL-CCNC: 13 U/L (ref 13–60)
LYMPHOCYTES # BLD AUTO: 1.2 10E3/UL (ref 0.8–5.3)
LYMPHOCYTES NFR BLD AUTO: 28 %
MCH RBC QN AUTO: 24.7 PG (ref 26.5–33)
MCHC RBC AUTO-ENTMCNC: 29.7 G/DL (ref 31.5–36.5)
MCV RBC AUTO: 83 FL (ref 78–100)
MONOCYTES # BLD AUTO: 0.3 10E3/UL (ref 0–1.3)
MONOCYTES NFR BLD AUTO: 7 %
NEUTROPHILS # BLD AUTO: 2.6 10E3/UL (ref 1.6–8.3)
NEUTROPHILS NFR BLD AUTO: 61 %
NRBC # BLD AUTO: 0 10E3/UL
NRBC BLD AUTO-RTO: 0 /100
PLATELET # BLD AUTO: 267 10E3/UL (ref 150–450)
POTASSIUM SERPL-SCNC: 3.5 MMOL/L (ref 3.4–5.3)
PROT SERPL-MCNC: 7 G/DL (ref 6.4–8.3)
RBC # BLD AUTO: 3.96 10E6/UL (ref 3.8–5.2)
RSV RNA SPEC NAA+PROBE: NEGATIVE
SARS-COV-2 RNA RESP QL NAA+PROBE: NEGATIVE
SODIUM SERPL-SCNC: 137 MMOL/L (ref 136–145)
TROPONIN T SERPL HS-MCNC: <6 NG/L
WBC # BLD AUTO: 4.3 10E3/UL (ref 4–11)

## 2022-08-21 PROCEDURE — 83690 ASSAY OF LIPASE: CPT | Performed by: EMERGENCY MEDICINE

## 2022-08-21 PROCEDURE — 96374 THER/PROPH/DIAG INJ IV PUSH: CPT

## 2022-08-21 PROCEDURE — 85025 COMPLETE CBC W/AUTO DIFF WBC: CPT | Performed by: EMERGENCY MEDICINE

## 2022-08-21 PROCEDURE — 87637 SARSCOV2&INF A&B&RSV AMP PRB: CPT | Performed by: EMERGENCY MEDICINE

## 2022-08-21 PROCEDURE — 99285 EMERGENCY DEPT VISIT HI MDM: CPT | Mod: CS,25

## 2022-08-21 PROCEDURE — C9803 HOPD COVID-19 SPEC COLLECT: HCPCS

## 2022-08-21 PROCEDURE — 84703 CHORIONIC GONADOTROPIN ASSAY: CPT | Performed by: EMERGENCY MEDICINE

## 2022-08-21 PROCEDURE — 96375 TX/PRO/DX INJ NEW DRUG ADDON: CPT

## 2022-08-21 PROCEDURE — 93005 ELECTROCARDIOGRAM TRACING: CPT

## 2022-08-21 PROCEDURE — 250N000011 HC RX IP 250 OP 636: Performed by: EMERGENCY MEDICINE

## 2022-08-21 PROCEDURE — 71045 X-RAY EXAM CHEST 1 VIEW: CPT

## 2022-08-21 PROCEDURE — 36415 COLL VENOUS BLD VENIPUNCTURE: CPT | Performed by: EMERGENCY MEDICINE

## 2022-08-21 PROCEDURE — 80053 COMPREHEN METABOLIC PANEL: CPT | Performed by: EMERGENCY MEDICINE

## 2022-08-21 PROCEDURE — 84484 ASSAY OF TROPONIN QUANT: CPT | Performed by: EMERGENCY MEDICINE

## 2022-08-21 RX ORDER — DIPHENHYDRAMINE HYDROCHLORIDE 50 MG/ML
25 INJECTION INTRAMUSCULAR; INTRAVENOUS ONCE
Status: DISCONTINUED | OUTPATIENT
Start: 2022-08-21 | End: 2022-08-21

## 2022-08-21 RX ORDER — KETOROLAC TROMETHAMINE 15 MG/ML
15 INJECTION, SOLUTION INTRAMUSCULAR; INTRAVENOUS ONCE
Status: COMPLETED | OUTPATIENT
Start: 2022-08-21 | End: 2022-08-21

## 2022-08-21 RX ORDER — ONDANSETRON 4 MG/1
4 TABLET, ORALLY DISINTEGRATING ORAL EVERY 8 HOURS PRN
Qty: 10 TABLET | Refills: 0 | Status: SHIPPED | OUTPATIENT
Start: 2022-08-21 | End: 2022-08-24

## 2022-08-21 RX ADMIN — PROCHLORPERAZINE EDISYLATE 10 MG: 5 INJECTION INTRAMUSCULAR; INTRAVENOUS at 02:20

## 2022-08-21 RX ADMIN — KETOROLAC TROMETHAMINE 15 MG: 15 INJECTION, SOLUTION INTRAMUSCULAR; INTRAVENOUS at 02:19

## 2022-08-21 ASSESSMENT — ENCOUNTER SYMPTOMS
DYSURIA: 0
FEVER: 0
ABDOMINAL PAIN: 0
DIARRHEA: 1
ARTHRALGIAS: 1
CHILLS: 0
HEADACHES: 1
RHINORRHEA: 0
SHORTNESS OF BREATH: 1
COUGH: 0

## 2022-08-21 ASSESSMENT — ACTIVITIES OF DAILY LIVING (ADL): ADLS_ACUITY_SCORE: 35

## 2022-08-21 NOTE — ED TRIAGE NOTES
Headache, body aches and nausea for 1 day.  Pt has multiple medications (motrin, amoxicillin, omeprazole, biaxan) she was prescribed 7/11 and started taking today.

## 2022-08-21 NOTE — ED PROVIDER NOTES
History   Chief Complaint:  Headache     The history is provided by the patient (& RN).      Heydi Loza is a 42 year old female with history of tension headaches, anemia, dyslipidemia, obesity, vitamin D deficiency, and H. Pylori infection who presents with headache amongst other complaints. The patient reports she began her antibiotics which she was prescribed on 7/11 for an H. Pylori infection on 8/8. Around [id number] last night the patient began feeling something unusual in her chest similar to congestion from the flu. She endorses mild shortness of breath, dull headache, mild nonbloody diarrhea and dry mouth.  The patient reports she is 2x vaccinated against COVID-19, but had no booster. She reports no past lung or heart issues. She also reports no use of hormones or birth control. She denies any recent long travel. At this time, she reports no abdominal pain, focal weakness, paresthesias, cough, fever, or chills. She also reports no urinary problems.     Review of Systems   Constitutional: Negative for chills and fever.   HENT: Negative for rhinorrhea.         Dry mouth   Respiratory: Positive for shortness of breath. Negative for cough.    Cardiovascular: Positive for chest pain.   Gastrointestinal: Positive for diarrhea. Negative for abdominal pain.   Genitourinary: Negative for dysuria.   Musculoskeletal: Positive for arthralgias.   Neurological: Positive for headaches (left-sided).   All other systems reviewed and are negative.      Allergies:  No Known Drug Allergies    Medications:  Amoxicillin  Omeprazole  Motrin   Biaxan    Past Medical History:     H. Pylori infection  Iron deficiency anemia  Dyspepsia  Dyslipidemia  Tension headache  Bladder injury  Postpartum hemorrhage  Female circumcision  Obesity  Macrosomia  Vitamin D deficiency  Postpartum depression   Hepatitis C antibodies   Immune to rubella, hepatitis A, and hepatitis B  Adjustment reaction with anxiety and depression  Sleep  disturbances  Sinus tarsi syndrome    Past Surgical History:     x3    Social History:  The patient presents to the ED alone  PCP: Caty Lima CNM  Denies ETOH/drug use    Physical Exam     Patient Vitals for the past 24 hrs:   BP Temp Temp src Pulse Resp SpO2   22 0206 112/79 99.6  F (37.6  C) Temporal 71 16 100 %     Physical Exam  General: Well-nourished, nontoxic  Eyes: PERRL, EOMI, no nystagmus.  No scleral icterus or conjunctival injection.    ENT:  Moist mucus membranes, posterior oropharynx clear without erythema or exudates. TM normal bilaterally. No posterior oropharyngeal erythema/exudate.   Neck: Supple with full range of motion  Respiratory:  Lungs clear to auscultation bilaterally, no crackles/rubs/wheezes.  Good air movement  CV: Normal rate and rhythm, no murmurs/rubs/gallops  GI:  Abdomen soft and non-distended.  No tenderness, guarding or rebound  Skin: Warm, dry.  No rashes or petechiae  MSK: No peripheral edema or calf tenderness  Neuro: Alert and oriented to person/place/time.  No aphasia/facial droop/dysarthria.  Tongue midline, normal strength at SCM/trapezius/BUE/BLE.  Normal finger to nose. Normal gait.  Sensation intact over face/BUE/BLE  Psychiatric: Normal affect    Emergency Department Course   ECG  ECG results from 22   EKG 12 lead     Value    Systolic Blood Pressure     Diastolic Blood Pressure     Ventricular Rate 51    Atrial Rate 51    TN Interval 152    QRS Duration 86        QTc 433    P Axis 31    R AXIS 15    T Axis 31    Interpretation ECG      Sinus bradycardia  Otherwise normal ECG  When compared with ECG of 2019 19:02,  Non-specific change in ST segment in Anterior leads       Imaging:  XR Chest Port 1 View   Final Result   IMPRESSION: Heart size within normal limits. Lungs are clear. No pneumothorax or pleural effusion.        Report per radiology    Laboratory:  Labs Ordered and Resulted from Time of ED Arrival to Time of ED  Departure   COMPREHENSIVE METABOLIC PANEL - Abnormal       Result Value    Sodium 137      Potassium 3.5      Creatinine 0.65      Urea Nitrogen 5.9 (*)     Chloride 104      Carbon Dioxide (CO2) 22      Anion Gap 11      Glucose 95      Calcium 9.0      Protein Total 7.0      Albumin 4.2      Bilirubin Total 0.2      Alkaline Phosphatase 56      AST 21      ALT 13      GFR Estimate >90     CBC WITH PLATELETS AND DIFFERENTIAL - Abnormal    WBC Count 4.3      RBC Count 3.96      Hemoglobin 9.8 (*)     Hematocrit 33.0 (*)     MCV 83      MCH 24.7 (*)     MCHC 29.7 (*)     RDW 16.5 (*)     Platelet Count 267      % Neutrophils 61      % Lymphocytes 28      % Monocytes 7      % Eosinophils 3      % Basophils 1      % Immature Granulocytes 0      NRBCs per 100 WBC 0      Absolute Neutrophils 2.6      Absolute Lymphocytes 1.2      Absolute Monocytes 0.3      Absolute Eosinophils 0.1      Absolute Basophils 0.0      Absolute Immature Granulocytes 0.0      Absolute NRBCs 0.0     LIPASE - Normal    Lipase 13     HCG QUALITATIVE PREGNANCY - Normal    hCG Serum Qualitative Negative     INFLUENZA A/B & SARS-COV2 PCR MULTIPLEX - Normal    Influenza A PCR Negative      Influenza B PCR Negative      RSV PCR Negative      SARS CoV2 PCR Negative     TROPONIN T, HIGH SENSITIVITY - Normal    Troponin T, High Sensitivity <6        Emergency Department Course:     Reviewed:  I reviewed nursing notes, vitals, past medical history and Care Everywhere    Assessments:  0215 I obtained history and examined the patient as noted above.   0331 I rechecked the patient and explained findings.     Interventions:  Medications   0.9% sodium chloride BOLUS (has no administration in time range)   prochlorperazine (COMPAZINE) injection 10 mg (10 mg Intravenous Given 8/21/22 0220)   ketorolac (TORADOL) injection 15 mg (15 mg Intravenous Given 8/21/22 0219)     Disposition:  The patient was discharged to home.     Impression & Plan     Medical Decision  Making:  Patient is a 42-year-old female presenting with a myriad of complaints including chest pain, dyspnea and congestion as well as headache.  She is nontoxic, neurologically intact.  She is without meningeal signs and I do not feel advanced neuroimaging is warranted at this point in time as I doubt serious neurologic sequelae.  EKG without focal ischemia or underlying arrhythmia.  High-sensitivity screening troponin negative, clinically doubt ACS given timeline of symptoms.  PERC negative, clinically doubt PE.  Chest x-ray without focal pneumonia, fluid overload, widened mediastinum or pneumothorax.  Labs with chronic anemia at baseline, no significant electrolyte derangement.  She tested negative for COVID-19 during her time in the ED.  She is not pregnant today. No abdominal tenderness, I dobut intraabdominal source to explain her pain. After IV fluids, antiemetics and Toradol she reported significant symptom improvement.  I do not feel further emergent testing is warranted at this point in time.  I did discuss with patient possible her presentation is secondary to more viral etiology though I have a lower clinical suspicion for serious bacterial etiology at this point in time.  Monitor for symptoms and should symptoms worsen or change to promptly represent to the ED for further evaluation.    Diagnosis:    ICD-10-CM    1. Nonintractable headache, unspecified chronicity pattern, unspecified headache type  R51.9    2. Atypical chest pain  R07.89        Discharge Medications:  New Prescriptions    ONDANSETRON (ZOFRAN ODT) 4 MG ODT TAB    Take 1 tablet (4 mg) by mouth every 8 hours as needed for nausea     Scribe Disclosure:  Farshad POLLACK, am serving as a scribe at 2:10 AM on 8/21/2022 to document services personally performed by Ilana Bynum DO based on my observations and the provider's statements to me.          Ilana Bynum DO  08/21/22 [id number]

## 2022-08-21 NOTE — ED NOTES
Bed: [facility]  Expected date:   Expected time:   Means of arrival:   Comments:  BV1  42 F  Headache, weakness

## 2022-08-22 LAB
ATRIAL RATE - MUSE: 51 BPM
DIASTOLIC BLOOD PRESSURE - MUSE: NORMAL MMHG
INTERPRETATION ECG - MUSE: NORMAL
P AXIS - MUSE: 31 DEGREES
PR INTERVAL - MUSE: 152 MS
QRS DURATION - MUSE: 86 MS
QT - MUSE: 470 MS
QTC - MUSE: 433 MS
R AXIS - MUSE: 15 DEGREES
SYSTOLIC BLOOD PRESSURE - MUSE: NORMAL MMHG
T AXIS - MUSE: 31 DEGREES
VENTRICULAR RATE- MUSE: 51 BPM

## 2023-03-15 ENCOUNTER — HOSPITAL ENCOUNTER (EMERGENCY)
Facility: CLINIC | Age: 43
Discharge: HOME | End: 2023-03-15
Payer: COMMERCIAL

## 2023-03-15 VITALS
DIASTOLIC BLOOD PRESSURE: 82 MMHG | TEMPERATURE: 98.8 F | RESPIRATION RATE: 18 BRPM | OXYGEN SATURATION: 97 % | HEART RATE: 68 BPM | SYSTOLIC BLOOD PRESSURE: 114 MMHG

## 2023-03-15 DIAGNOSIS — J02.0 STREPTOCOCCAL PHARYNGITIS: ICD-10-CM

## 2023-03-15 LAB
DEPRECATED S PYO AG THROAT QL EIA: POSITIVE
FLUAV RNA SPEC QL NAA+PROBE: NEGATIVE
FLUBV RNA RESP QL NAA+PROBE: NEGATIVE
RSV RNA SPEC NAA+PROBE: NEGATIVE
SARS-COV-2 RNA RESP QL NAA+PROBE: NEGATIVE

## 2023-03-15 PROCEDURE — 87637 SARSCOV2&INF A&B&RSV AMP PRB: CPT

## 2023-03-15 PROCEDURE — 87880 STREP A ASSAY W/OPTIC: CPT

## 2023-03-15 PROCEDURE — C9803 HOPD COVID-19 SPEC COLLECT: HCPCS

## 2023-03-15 PROCEDURE — 250N000013 HC RX MED GY IP 250 OP 250 PS 637

## 2023-03-15 PROCEDURE — 99284 EMERGENCY DEPT VISIT MOD MDM: CPT | Mod: CS

## 2023-03-15 RX ORDER — AMOXICILLIN 500 MG/1
1000 CAPSULE ORAL DAILY
Qty: 18 CAPSULE | Refills: 0 | Status: SHIPPED | OUTPATIENT
Start: 2023-03-15 | End: 2023-03-16

## 2023-03-15 RX ORDER — AMOXICILLIN 500 MG/1
1000 CAPSULE ORAL ONCE
Status: COMPLETED | OUTPATIENT
Start: 2023-03-15 | End: 2023-03-15

## 2023-03-15 RX ADMIN — Medication 10 MG: at 22:27

## 2023-03-15 RX ADMIN — AMOXICILLIN 1000 MG: 500 CAPSULE ORAL at 22:27

## 2023-03-16 RX ORDER — AMOXICILLIN 500 MG/1
1000 CAPSULE ORAL DAILY
Qty: 18 CAPSULE | Refills: 0 | Status: SHIPPED | OUTPATIENT
Start: 2023-03-16

## 2023-03-16 NOTE — ED PROVIDER NOTES
History     Chief Complaint:  Pharyngitis     The history is provided by the patient.      Heydi Loza is a 42 year old female who presents with pharyngitis. About 5 days she started having sore throat which has been progressively worsening. She states her son was sick with strep about 3 ago. She is able to drink fluids but it's painful. No acute voice change, stridor, drooling. She also endorses myalgias. No fever, cough, shortness of breath, chest pain. Mild headache. No abdominal pain, nausea, or vomiting. No rash. No neck stiffness.    Independent Historian:   None - Patient Only    Review of External Notes: no one allergies to antibiotics      ROS:  A comprehensive ROS was obtained and is negative aside from those called out in the HPI above.     Allergies:  Naproxen     Medications:    Vitamin D3  Prilosec  Ferrous sulfate    Past Medical History:    Iron deficiency anemia   Vitamin D deficiency   Postpartum depression   Varicosities  Dyslipidemia     Past Surgical History:    Past Surgical History:   Procedure Laterality Date      SECTION  2011    Procedure: SECTION; Surgeon:ANDRES LU; Location:[facility]      SECTION N/A 2015    Procedure:  SECTION;  Surgeon: Shalonda Rosales MD;  Location: [facility] L+[facility]      SECTION N/A 2019    Procedure: Repeat  Section;  Surgeon: Tiana Mcmahan MD;  Location: [facility] L+[facility]        Family History:    No pertinent family history    Social History:  Patient presents to the ED via private vehicle with her daughter   PCP: Caty Lima     Physical Exam     Patient Vitals for the past 24 hrs:   BP Temp Temp src Pulse Resp SpO2   03/15/23 2142 110/70 98.8  F (37.1  C) Oral 78 18 100 %        Physical Exam  General: Pleasant nontoxic-appearing adult female.  HENT:   Ears: TMs normal.  Head: No facial swelling.  Mouth/Throat: Moist mucous membranes.  Uvula is midline.  Bilateral tonsillar  erythema, exudate and edema.  No drooling or stridor.  No acute voice change.  Eyes: Conjunctive and EOM normal. PERRLA.  Neck: Normal ROM. No rigidity.  CV: Regular rate and rhythm. Normal S1, S2.   Resp: Lungs clear to auscultation bilaterally. Normal respiratory effort. No stridor or cough observed.  GI: Abdomen soft, non distended and nontender. No rebound or guarding.  MSK: Normal range of motion.  Skin: Warm and dry.  No rash.  Neuro: Awake, alert, oriented x 3.  Psych: Normal mood and affect.    Emergency Department Course     Laboratory:  Labs Ordered and Resulted from Time of ED Arrival to Time of ED Departure   STREPTOCOCCUS A RAPID SCREEN W REFELX TO PCR - Abnormal       Result Value    Group A Strep antigen Positive (*)    INFLUENZA A/B, RSV, & SARS-COV2 PCR - Normal    Influenza A PCR Negative      Influenza B PCR Negative      RSV PCR Negative      SARS CoV2 PCR Negative          Emergency Department Course & Assessments:     Interventions:  Medications   dexamethasone (DECADRON) alcohol-free oral solution 10 mg (10 mg Oral $Given 3/15/23 2227)   amoxicillin (AMOXIL) capsule 1,000 mg (1,000 mg Oral $Given 3/15/23 2227)        Independent Interpretation (X-rays, CTs, rhythm strip):  None    Assessments/Consultations/Discussion of Management or Tests:  2215 I obtained history and examined the patient as noted above.  I discussed that her strep test was positive.  We will give her her first dose of antibiotics and a dose of Decadron here.    Social Determinants of Health affecting care:   None    Disposition:  The patient was discharged to home.     Impression & Plan      Medical Decision Making:  Heydi is a pleasant 42-year-old female who came into the emergency department today for evaluation of sore throat over the past 5 days.  On arrival she was afebrile and nontoxic-appearing.  On exam, she had bilateral tonsillar edema, erythema and exudate.  Uvula was midline without any sign of peritonsillar  abscess.  There was no acute voice change concerning for epiglottitis.  Strep test done in triage returned positive.  COVID/flu/RSV test done in triage returned negative.  I provided her with a dose of Decadron and her first dose of amoxicillin here.  I prescribed her the rest of the amoxicillin to take daily for the next 9 days.  She will follow-up with her primary care provider for recheck as needed.  Return precautions discussed.    Diagnosis:    ICD-10-CM    1. Streptococcal pharyngitis  J02.0          Discharge Medications:  New Prescriptions    AMOXICILLIN (AMOXIL) 500 MG CAPSULE    Take 2 capsules (1,000 mg) by mouth daily for 9 days Your first dose was given 3/15/23. Please take your next dose 3/16/23.      Scribe Disclosure:  I, Farshad Damian, am serving as a scribe at 10:17 PM on 3/15/2023 to document services personally performed by Kim Alarcon PA-C based on my observations and the provider's statements to me.     3/15/2023   Kim Alarcon PA-C Dewing, Jennifer C, PA-C  03/15/23 [id number]

## 2023-03-16 NOTE — ED NOTES
I was notified that [facility] never received the electronic prescription for this patient.  We will print a paper prescription and the patient may pick this up at the .     Dashawn Burns,   03/16/23 0922

## 2023-03-16 NOTE — ED TRIAGE NOTES
Arrives from home with young daughter. States that she has a sore throat, which started over a week ago. Also reports body aches. States has not checked for fevers at home.   States last took ibuprofen last night with improvement of symptoms, has not take since.

## 2023-03-31 ENCOUNTER — HOSPITAL ENCOUNTER (EMERGENCY)
Facility: CLINIC | Age: 43
Discharge: HOME | End: 2023-04-01
Attending: EMERGENCY MEDICINE | Admitting: EMERGENCY MEDICINE
Payer: COMMERCIAL

## 2023-03-31 DIAGNOSIS — R00.2 PALPITATIONS: ICD-10-CM

## 2023-03-31 DIAGNOSIS — R07.89 ATYPICAL CHEST PAIN: ICD-10-CM

## 2023-03-31 LAB
ANION GAP SERPL CALCULATED.3IONS-SCNC: 8 MMOL/L (ref 7–15)
BASOPHILS # BLD AUTO: 0 10E3/UL (ref 0–0.2)
BASOPHILS NFR BLD AUTO: 1 %
BUN SERPL-MCNC: 7.4 MG/DL (ref 6–20)
CALCIUM SERPL-MCNC: 8.7 MG/DL (ref 8.6–10)
CHLORIDE SERPL-SCNC: 106 MMOL/L (ref 98–107)
CREAT SERPL-MCNC: 0.63 MG/DL (ref 0.51–0.95)
DEPRECATED HCO3 PLAS-SCNC: 24 MMOL/L (ref 22–29)
EOSINOPHIL # BLD AUTO: 0.2 10E3/UL (ref 0–0.7)
EOSINOPHIL NFR BLD AUTO: 4 %
ERYTHROCYTE [DISTWIDTH] IN BLOOD BY AUTOMATED COUNT: 13.9 % (ref 10–15)
GFR SERPL CREATININE-BSD FRML MDRD: >90 ML/MIN/1.73M2
GLUCOSE SERPL-MCNC: 98 MG/DL (ref 70–99)
HCG SERPL QL: NEGATIVE
HCT VFR BLD AUTO: 33.7 % (ref 35–47)
HGB BLD-MCNC: 10.3 G/DL (ref 11.7–15.7)
HOLD SPECIMEN: NORMAL
IMM GRANULOCYTES # BLD: 0 10E3/UL
IMM GRANULOCYTES NFR BLD: 0 %
LYMPHOCYTES # BLD AUTO: 1.9 10E3/UL (ref 0.8–5.3)
LYMPHOCYTES NFR BLD AUTO: 40 %
MCH RBC QN AUTO: 25.9 PG (ref 26.5–33)
MCHC RBC AUTO-ENTMCNC: 30.6 G/DL (ref 31.5–36.5)
MCV RBC AUTO: 85 FL (ref 78–100)
MONOCYTES # BLD AUTO: 0.3 10E3/UL (ref 0–1.3)
MONOCYTES NFR BLD AUTO: 7 %
NEUTROPHILS # BLD AUTO: 2.3 10E3/UL (ref 1.6–8.3)
NEUTROPHILS NFR BLD AUTO: 48 %
NRBC # BLD AUTO: 0 10E3/UL
NRBC BLD AUTO-RTO: 0 /100
PLATELET # BLD AUTO: 270 10E3/UL (ref 150–450)
POTASSIUM SERPL-SCNC: 3.7 MMOL/L (ref 3.4–5.3)
RBC # BLD AUTO: 3.97 10E6/UL (ref 3.8–5.2)
SODIUM SERPL-SCNC: 138 MMOL/L (ref 136–145)
TROPONIN T SERPL HS-MCNC: <6 NG/L
WBC # BLD AUTO: 4.7 10E3/UL (ref 4–11)

## 2023-03-31 PROCEDURE — 84703 CHORIONIC GONADOTROPIN ASSAY: CPT | Performed by: EMERGENCY MEDICINE

## 2023-03-31 PROCEDURE — 99285 EMERGENCY DEPT VISIT HI MDM: CPT | Mod: 25

## 2023-03-31 PROCEDURE — 85025 COMPLETE CBC W/AUTO DIFF WBC: CPT | Performed by: EMERGENCY MEDICINE

## 2023-03-31 PROCEDURE — 84484 ASSAY OF TROPONIN QUANT: CPT | Performed by: EMERGENCY MEDICINE

## 2023-03-31 PROCEDURE — 80048 BASIC METABOLIC PNL TOTAL CA: CPT | Performed by: EMERGENCY MEDICINE

## 2023-03-31 PROCEDURE — 36415 COLL VENOUS BLD VENIPUNCTURE: CPT | Performed by: EMERGENCY MEDICINE

## 2023-03-31 PROCEDURE — 93005 ELECTROCARDIOGRAM TRACING: CPT

## 2023-04-01 ENCOUNTER — APPOINTMENT (OUTPATIENT)
Dept: GENERAL RADIOLOGY | Facility: CLINIC | Age: 43
End: 2023-04-01
Attending: EMERGENCY MEDICINE
Payer: COMMERCIAL

## 2023-04-01 VITALS
HEIGHT: 60 IN | DIASTOLIC BLOOD PRESSURE: 65 MMHG | SYSTOLIC BLOOD PRESSURE: 106 MMHG | BODY MASS INDEX: 33.96 KG/M2 | HEART RATE: 60 BPM | WEIGHT: 173 LBS | TEMPERATURE: 97.5 F | RESPIRATION RATE: 18 BRPM | OXYGEN SATURATION: 99 %

## 2023-04-01 PROCEDURE — 71046 X-RAY EXAM CHEST 2 VIEWS: CPT

## 2023-04-01 NOTE — ED TRIAGE NOTES
"Presents to triage with c/o palpitations and mid sternal chest \"tightness\" that began about 30 min PTA. 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 Patient denies all other symptoms, no previous cardiac hx      "

## 2023-04-01 NOTE — ED PROVIDER NOTES
History     Chief Complaint:  Palpitations and Chest Pain       HPI   Heydi Loza is a 42 year old female  who presents with chest tightness.  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  It was associated with heart palpitations feeling as if her heart was beating out of her chest.  She denies shortness of breath denies that pain is worsened with deep breath.  Denied cough denied fever.  Denies leg swelling denies abdominal pain.  Pain is now resolved..            Allergies:  Naproxen  Nkda [No Known Drug Allergies]     Medications:    amoxicillin (AMOXIL) 500 MG capsule  ibuprofen (ADVIL/MOTRIN) 600 MG tablet  Prenatal Vit-Fe Fumarate-FA (PRENATAL MULTIVITAMIN W/IRON) 27-0.8 MG tablet        Past Medical History:    Past Medical History:   Diagnosis Date     GBS (group B Streptococcus carrier), +RV culture, currently pregnant 8/10/2011     IUD -- Mirena 10/28/2011     Menarche age 18     Postpartum depression 2011     Sprain of ankle, unspecified site 2013     Varicosities        Past Surgical History:    Past Surgical History:   Procedure Laterality Date      SECTION  2011    Procedure: SECTION; Surgeon:ANDRES LU; Location:[facility]      SECTION N/A 2015    Procedure:  SECTION;  Surgeon: Shalonda Rosales MD;  Location: [facility]      SECTION N/A 2019    Procedure: Repeat  Section;  Surgeon: Tiana Mcmahan MD;  Location: [facility]        Family History:    family history is not on file.    Social History:   reports that she has never smoked. She has never used smokeless tobacco. She reports that she does not drink alcohol and does not use drugs.  PCP: Caty Lima     Physical Exam     Patient Vitals for the past 24 hrs:   BP Temp Temp src Pulse Resp SpO2 Height Weight   23 2153 130/84 97.5  F (36.4  C)  Temporal 84 18 100 % 1.524 m (5') 78.5 kg (173 lb)        Physical Exam  Constitutional:  Oriented to person, place, and time.   HENT:   Head:    Normocephalic.   Mouth/Throat:   Oropharynx is clear and moist.   Eyes:    EOM are normal. Pupils are equal, round, and reactive to light.   Neck:    Neck supple.   Cardiovascular:  Normal rate, regular rhythm and normal heart sounds.      Exam reveals no gallop and no friction rub.       No murmur heard.  Pulmonary/Chest:  Effort normal and breath sounds normal.      No respiratory distress. No wheezes. No rales.      No reproducible chest wall pain.  Abdominal:   Soft. No distension. No tenderness. No rebound and no guarding.   Musculoskeletal:  Normal range of motion. 2+ distal equal pulses.  No leg calf tenderness, swelling or edema.    Neurological:   Alert and oriented to person, place, and time.           Moves all 4 extremities spontaneously    Skin:    No rash noted. No pallor.       Emergency Department Course   ECG  ECG taken at 2207, ECG read at 2210  Normal sinus rhythm with sinus arrhythmia   Low voltage QRS   Borderline ECG    Rate 63 bpm. ID interval 138 ms. QRS duration 80 ms. QT/QTc 412/421 ms. P-R-T axes 33 4 27.    Imaging:  Chest XR,  PA & LAT   Final Result   IMPRESSION: Negative chest.        Report per radiology    Laboratory:  Labs Ordered and Resulted from Time of ED Arrival to Time of ED Departure   CBC WITH PLATELETS AND DIFFERENTIAL - Abnormal       Result Value    WBC Count 4.7      RBC Count 3.97      Hemoglobin 10.3 (*)     Hematocrit 33.7 (*)     MCV 85      MCH 25.9 (*)     MCHC 30.6 (*)     RDW 13.9      Platelet Count 270      % Neutrophils 48      % Lymphocytes 40      % Monocytes 7      % Eosinophils 4      % Basophils 1      % Immature Granulocytes 0      NRBCs per 100 WBC 0      Absolute Neutrophils 2.3      Absolute Lymphocytes 1.9      Absolute Monocytes 0.3      Absolute Eosinophils 0.2      Absolute Basophils 0.0      Absolute  Immature Granulocytes 0.0      Absolute NRBCs 0.0     BASIC METABOLIC PANEL - Normal    Sodium 138      Potassium 3.7      Chloride 106      Carbon Dioxide (CO2) 24      Anion Gap 8      Urea Nitrogen 7.4      Creatinine 0.63      Calcium 8.7      Glucose 98      GFR Estimate >90     TROPONIN T, HIGH SENSITIVITY - Normal    Troponin T, High Sensitivity <6     HCG QUALITATIVE PREGNANCY - Normal    hCG Serum Qualitative Negative              Emergency Department Course & Assessments:             Interventions:  Medications - No data to display     Independent Interpretation (X-rays, CTs, rhythm strip):  Chest x-ray-no acute disease interpreted by myself.          Disposition:  The patient was discharged to home.     Impression & Plan      Medical Decision Making:  Heydi Loza is a 42 year old female presented to the Emergency Department with a complaint of chest pain. Fortunately the workup in the ED has been unremarkable and at this time I am not concerned for ACS. The EKG shows no acute ischemic changes. The troponin is negative .     I considered other possible causes of chest pain including PE (PERC negative), infection, pneumothorax, aortic dissection, and even more benign causes such as reflux and esophageal motility issues. The physical exam, laboratory, and radiological findings listed above make life threatening conditions less likely. At this time I believe the patient is safe for discharge. I have encouraged close outpatient follow up.    Anticipatory guidance given prior to discharge.        Diagnosis:    ICD-10-CM    1. Atypical chest pain  R07.89       2. Palpitations  R00.2            Discharge Medications:  New Prescriptions    No medications on file          Santos Wilkinson MD    4/1/2023   Santos Wilkinson MD Shapin, Ryan P, MD  04/01/23 0730

## 2023-04-01 NOTE — DISCHARGE INSTRUCTIONS
Discharge Instructions  Chest Pain    You have been seen today for chest pain or discomfort.  At this time, your provider has found no signs that your chest pain is due to a serious or life-threatening condition, (or you have declined more testing and/or admission to the hospital). However, sometimes there is a serious problem that does not show up right away. Your evaluation today may not be complete and you may need further testing and evaluation.     Generally, every Emergency Department visit should have a follow-up clinic visit with either a primary or a specialty clinic/provider. Please follow-up as instructed by your emergency provider today.  Return to the Emergency Department if:  Your chest pain changes, gets worse, starts to happen more often, or comes with less activity.  You are newly short of breath.  You get very weak or tired.  You pass out or faint.  You have any new symptoms, like fever, cough, numb legs, or you cough up blood.  You have anything else that worries you.    Until you follow-up with your regular provider, please do the following:  Take one aspirin daily unless you have an allergy or are told not to by your provider.  If a stress test appointment has been made, go to the appointment.  If you have questions, contact your regular provider.  Follow-up with your regular provider/clinic as directed; this is very important.    If you were given a prescription for medicine here today, be sure to read all of the information (including the package insert) that comes with your prescription.  This will include important information about the medicine, its side effects, and any warnings that you need to know about.  The pharmacist who fills the prescription can provide more information and answer questions you may have about the medicine.  If you have questions or concerns that the pharmacist cannot address, please call or return to the Emergency Department.       Remember that you can always come  back to the Emergency Department if you are not able to see your regular provider in the amount of time listed above, if you get any new symptoms, or if there is anything that worries you.  Discharge Instructions  Palpitations    Palpitations are an unusual awareness of your heartbeat. People often describe this as the heart skipping, fluttering, racing, irregular, or pounding. At this time, your provider has found no signs that your palpitations are due to a serious or life-threatening condition. However, sometimes there is a serious problem that does not show up right away.    Palpitations can be caused by caffeine, cigarettes, diet pills, energy drinks or supplements, other stimulants, and medications and street drugs. They can also be caused by anxiety, hormone conditions such as high thyroid, and other medical conditions. Sometimes they are a sign of abnormal rhythm in the heart. At this time, your provider did not find any dangerous cause of your symptoms.    Generally, every Emergency Department visit should have a follow-up clinic visit with either a primary or a specialty clinic/provider. Please follow-up as instructed by your emergency provider today.    Return to the Emergency Department if:  You get chest pain or tightness.  You are short of breath.  You get very weak or tired.  You pass out or faint.  Your heart rate is over 120 beats per minute for more than 10 minutes while you are resting.   You have anything else that worries you.    What can I do to help myself?  Fill any prescriptions the provider gave you and take them right away.   Follow your provider s instructions about the prescription medicines you are on. Sometimes the provider may tell you to stop taking a medicine or change the dose.  If you smoke, this may be a good time to quit! The less you can smoke, the better.  Do not use energy drinks, diet pills, or stimulants. Limit your use of caffeine.  If you were given a prescription for  medicine here today, be sure to read all of the information (including the package insert) that comes with your prescription.  This will include important information about the medicine, its side effects, and any warnings that you need to know about.  The pharmacist who fills the prescription can provide more information and answer questions you may have about the medicine.  If you have questions or concerns that the pharmacist cannot address, please call or return to the Emergency Department.     Remember that you can always come back to the Emergency Department if you are not able to see your regular provider in the amount of time listed above, if you get any new symptoms, or if there is anything that worries you.

## 2023-04-03 LAB
ATRIAL RATE - MUSE: 63 BPM
DIASTOLIC BLOOD PRESSURE - MUSE: NORMAL MMHG
INTERPRETATION ECG - MUSE: NORMAL
P AXIS - MUSE: 33 DEGREES
PR INTERVAL - MUSE: 138 MS
QRS DURATION - MUSE: 80 MS
QT - MUSE: 412 MS
QTC - MUSE: 421 MS
R AXIS - MUSE: 4 DEGREES
SYSTOLIC BLOOD PRESSURE - MUSE: NORMAL MMHG
T AXIS - MUSE: 27 DEGREES
VENTRICULAR RATE- MUSE: 63 BPM

## 2024-01-08 ENCOUNTER — HOSPITAL ENCOUNTER (EMERGENCY)
Facility: CLINIC | Age: 44
Discharge: HOME | End: 2024-01-08
Attending: EMERGENCY MEDICINE | Admitting: EMERGENCY MEDICINE
Payer: COMMERCIAL

## 2024-01-08 VITALS
RESPIRATION RATE: 16 BRPM | DIASTOLIC BLOOD PRESSURE: 62 MMHG | OXYGEN SATURATION: 100 % | SYSTOLIC BLOOD PRESSURE: 110 MMHG | HEART RATE: 74 BPM | TEMPERATURE: 97.7 F

## 2024-01-08 DIAGNOSIS — B97.89 SORE THROAT (VIRAL): ICD-10-CM

## 2024-01-08 DIAGNOSIS — J02.8 SORE THROAT (VIRAL): ICD-10-CM

## 2024-01-08 LAB — GROUP A STREP BY PCR: NOT DETECTED

## 2024-01-08 PROCEDURE — 250N000012 HC RX MED GY IP 250 OP 636 PS 637: Performed by: EMERGENCY MEDICINE

## 2024-01-08 PROCEDURE — 99283 EMERGENCY DEPT VISIT LOW MDM: CPT

## 2024-01-08 PROCEDURE — 87651 STREP A DNA AMP PROBE: CPT | Performed by: EMERGENCY MEDICINE

## 2024-01-08 RX ADMIN — DEXAMETHASONE 10 MG: 2 TABLET ORAL at 21:19

## 2024-01-08 ASSESSMENT — ACTIVITIES OF DAILY LIVING (ADL): ADLS_ACUITY_SCORE: 33

## 2024-01-09 NOTE — DISCHARGE INSTRUCTIONS
Discharge Instructions  Sore Throat  You were seen today for a sore throat, in medical terms this is called pharyngitis. A sore throat is most often caused by viral or bacterial infections; most of which (>80%) are caused by a virus. Viral infections are not treated with antibiotics, treatment for a viral sore throat consists mostly of treating symptoms (such as pain and fever) with over-the-counter pain relievers/fever reducers.  Strep throat is a kind of sore throat caused by Group A streptococcus bacteria.  This type of sore throat is treated with antibiotics so we test for this with a  strep test  and, if positive, prescribe antibiotics.  Generally, every Emergency Department visit should have a follow-up clinic visit with either a primary or a specialty clinic/provider. Please follow-up as instructed by your emergency provider today.  Return to the Emergency Department if:  If you have difficulty breathing.  If you are drooling because you are unable to swallow.  You become dehydrated due to difficulty drinking. Signs of dehydration include weakness, dry mouth, and urinating less than 3 times per day.  If you develop swelling of the neck or tongue.  If you develop a high fever with either severe or unusual headache or stiff neck.    Treatment:    Pain relief -- Non-prescription pain medications, such as Tylenol  (acetaminophen) or Motrin , Advil  (ibuprofen) are usually recommended for pain.  Do not use a medicine that you are allergic to, or if your provider has told you not to use it.  Soft or liquid diet. Concentrate on liquids to keep yourself hydrated. Cold liquids (popsicles, ice cream, etc.) may feel good on your throat.  If you were given a prescription for medicine here today, be sure to read all of the information (including the package insert) that comes with your prescription.  This will include important information about the medicine, its side effects, and any warnings that you need to know about.   The pharmacist who fills the prescription can provide more information and answer questions you may have about the medicine.  If you have questions or concerns that the pharmacist cannot address, please call or return to the Emergency Department.   Remember that you can always come back to the Emergency Department if you are not able to see your regular provider in the amount of time listed above, if you get any new symptoms, or if there is anything that worries you.

## 2024-01-09 NOTE — ED PROVIDER NOTES
History     Chief Complaint:  Pharyngitis     The history is provided by the patient.      Heydi Loza is a 43 year old female presenting with pharyngitis. Patient reports feeling under the weather for about a week. She endorses sore throat that persists and is painful with swallowing. She recently had a cough that resolved. She is drinking fluids normally. She took Tylenol or ibuprofen to manage symptoms, but not regularly. Voice is normal. Denies fever.     Independent Historian:   None - Patient Only      Medications:    Simethicone   Docusate sodium   Omeprazole     Past Medical History:    GBS   IUD  Menarche  Postpartum depression  Sprain of ankle, unspecified site  Varicosities  Vaginal itching  H pylori infection   Anemia   Dyspepsia   Dyslipidemia   Tension headache   Obesity   Adjustment disorder     Past Surgical History:    C section      Physical Exam   Patient Vitals for the past 24 hrs:   BP Temp Temp src Pulse Resp SpO2   01/08/24 1916 110/62 97.7  F (36.5  C) Temporal 74 16 100 %      Physical Exam  Gen: alert, answers all questions appropriately.   HEENT: PERRL. no tonsillar exudate and erythema. No trismus, uvula midline, oropharynx symmetric, no tracheal tenderness   Neck: full AROM  CV: RRR, no murmurs   Pulm: breath sounds equal, lungs clear  Skin: facial skin normal  Neuro: CN 5 and 7 normal     Emergency Department Course     Imaging:  No orders to display      Laboratory:  Labs Ordered and Resulted from Time of ED Arrival to Time of ED Departure   GROUP A STREPTOCOCCUS PCR THROAT SWAB - Normal       Result Value    Group A strep by PCR Not Detected        Emergency Department Course & Assessments:    Interventions:  Medications   dexAMETHasone (DECADRON) tablet 10 mg (has no administration in time range)      Independent Interpretation (X-rays, CTs, rhythm strip):  No imaging completed    Assessments/Consultations/Discussion of Management or Tests:  ED Course as of 01/08/24 2437    Mon Jan 08, 2024 2108 I obtained the history and examined the patient as noted above.        Social Determinants of Health affecting care:   None    Disposition:  The patient was discharged to home.     Impression & Plan      Medical Decision Making:  URI symptoms-cough improving.  Sore throat persistent.  Rapid strep negative.  No signs of tonsillitis on my exam.  No hoarse voice.  Normal range of motion of the neck.  Suspect ongoing viral sore throat.  Recommended follow-up with ENT 1 to 2 weeks if not improved.  Discharge home    Diagnosis:    ICD-10-CM    1. Sore throat (viral)  J02.8     B97.89          Discharge Medications: No new prescription  Discharge Medication List as of 1/8/2024  9:33 PM         Scribe Disclosure:  I, Senia Esquivel, am serving as a scribe at 9:14 PM on 1/8/2024 to document services personally performed by Roxana Jones MD based on my observations and the provider's statements to me.   1/8/2024   Roxana Jones MD Trussell, Kristi Jo Schneider, MD  01/09/24 0246

## 2024-01-11 ENCOUNTER — HOSPITAL ENCOUNTER (EMERGENCY)
Facility: CLINIC | Age: 44
Discharge: HOME | End: 2024-01-11
Attending: EMERGENCY MEDICINE | Admitting: EMERGENCY MEDICINE
Payer: COMMERCIAL

## 2024-01-11 VITALS
RESPIRATION RATE: 16 BRPM | WEIGHT: 178 LBS | BODY MASS INDEX: 30.39 KG/M2 | HEART RATE: 53 BPM | HEIGHT: 64 IN | OXYGEN SATURATION: 100 % | SYSTOLIC BLOOD PRESSURE: 130 MMHG | DIASTOLIC BLOOD PRESSURE: 65 MMHG | TEMPERATURE: 97.5 F

## 2024-01-11 DIAGNOSIS — J02.9 SORE THROAT: ICD-10-CM

## 2024-01-11 LAB
FLUAV RNA SPEC QL NAA+PROBE: NEGATIVE
FLUBV RNA RESP QL NAA+PROBE: NEGATIVE
MONOCYTES NFR BLD AUTO: NEGATIVE %
RSV RNA SPEC NAA+PROBE: NEGATIVE
SARS-COV-2 RNA RESP QL NAA+PROBE: NEGATIVE

## 2024-01-11 PROCEDURE — 86308 HETEROPHILE ANTIBODY SCREEN: CPT | Performed by: EMERGENCY MEDICINE

## 2024-01-11 PROCEDURE — 99283 EMERGENCY DEPT VISIT LOW MDM: CPT

## 2024-01-11 PROCEDURE — 250N000009 HC RX 250: Performed by: EMERGENCY MEDICINE

## 2024-01-11 PROCEDURE — 87637 SARSCOV2&INF A&B&RSV AMP PRB: CPT | Performed by: EMERGENCY MEDICINE

## 2024-01-11 PROCEDURE — 250N000013 HC RX MED GY IP 250 OP 250 PS 637: Performed by: EMERGENCY MEDICINE

## 2024-01-11 PROCEDURE — 36415 COLL VENOUS BLD VENIPUNCTURE: CPT | Performed by: EMERGENCY MEDICINE

## 2024-01-11 RX ORDER — LIDOCAINE HYDROCHLORIDE 20 MG/ML
5 SOLUTION OROPHARYNGEAL ONCE
Status: COMPLETED | OUTPATIENT
Start: 2024-01-11 | End: 2024-01-11

## 2024-01-11 RX ORDER — MAGNESIUM HYDROXIDE/ALUMINUM HYDROXICE/SIMETHICONE 120; 1200; 1200 MG/30ML; MG/30ML; MG/30ML
15 SUSPENSION ORAL ONCE
Status: COMPLETED | OUTPATIENT
Start: 2024-01-11 | End: 2024-01-11

## 2024-01-11 RX ADMIN — ALUMINUM HYDROXIDE, MAGNESIUM HYDROXIDE, AND SIMETHICONE 15 ML: 200; 200; 20 SUSPENSION ORAL at 02:14

## 2024-01-11 RX ADMIN — Medication 10 MG: at 02:14

## 2024-01-11 RX ADMIN — LIDOCAINE HYDROCHLORIDE 5 ML: 20 SOLUTION ORAL at 02:14

## 2024-01-11 ASSESSMENT — ACTIVITIES OF DAILY LIVING (ADL): ADLS_ACUITY_SCORE: 33

## 2024-01-11 NOTE — ED TRIAGE NOTES
Here for sore throat which started approx a week ago started with coughing. Seen here 2 days ago for same, ED recommended to f/up with ENT. Pt followed up with primary care today who states they believe problem is stomach acid instead. Pt states pain is too bad to go that route. Last ibuprofen 1300, no tylenol today.

## 2024-01-11 NOTE — ED PROVIDER NOTES
"  History     Chief Complaint:  Pharyngitis     The history is provided by the patient.      Heydi Loza is a 43 year old female with a history of dyslipidemia who presents to the emergency department for pharyngitis. The patient states that a week ago, she began experiencing pharyngitis and a cough. She notes that she had ibuprofen at 1300 but did not Tylenol today. She reports that she presented to the emergency department for similar symptoms 2 days ago and followed up with ENT. She adds that the ENT reported that they believe the problem is \"stomach acid.\"    Independent Historian:   None - Patient Only    Review of External Notes:   I reviewed ER note from 2024 in which the patient was seen for sore throat.  I reviewed outpatient office family medicine note from 2024 in which the patient was seen for pharyngitis.  It was thought that there may be an element of acid reflux complicating her sore throat.  Patient was started on proton pump inhibitor.    Medications:    Amoxicillin    Past Medical History:    IUD  Menarche  Postpartum depression  Varicosities  Dyslipidemia  H. Pylori  Tension headaches  Obesity    Past Surgical History:     x 3     Physical Exam   Patient Vitals for the past 24 hrs:   BP Temp Temp src Pulse Resp SpO2 Height Weight   24 0030 130/65 97.5  F (36.4  C) Temporal 53 16 100 % 1.626 m (5' 4\") 80.7 kg (178 lb)      Physical Exam  General: Patient is alert, awake and interactive when I enter the room  Head: The scalp, face, and head appear normal  Eyes: Conjunctivae are normal  ENT: The nose is normal, Pinnae are normal, External acoustic canals are normal.  Mild oropharyngeal erythema without significant exudates.  Tonsillar pillars are not significantly edematous.  No asymmetry.  Uvula is midline.  Low concern for peritonsillar abscess.  No trismus noted on exam.  Patient does not have any significant pain or tenderness with tracheal manipulation.  No evidence " of paratracheal abscess or mass.  Neck: Trachea midline  CV: Pulses are normal.   Resp: No respiratory distress   Musc: Normal muscular tone, moving all extremities.  Skin: No rash or lesions noted  Neuro:  Speech is normal and fluent. Face is symmetric.   Psych: Normal affect.  Appropriate interactions.    Emergency Department Course     Emergency Department Course & Assessments:    Interventions:  Medications   alum & mag hydroxide-simethicone (MAALOX) suspension 15 mL (15 mLs Oral $Given 1/11/24 0214)   lidocaine (viscous) (XYLOCAINE) 2 % solution 5 mL (5 mLs Mouth/Throat $Given 1/11/24 0214)   dexAMETHasone (DECADRON) alcohol-free oral solution 10 mg (10 mg Oral $Given 1/11/24 0214)      Assessments:  0215 I obtained history and examined the patient as noted above. I discussed findings and discharge with the patient. All questions answered.     Independent Interpretation (X-rays, CTs, rhythm strip):  None    Consultations/Discussion of Management or Tests:  None     Social Determinants of Health affecting care:   None    Disposition:  The patient was discharged to home.     Impression & Plan      Medical Decision Making:  Patient is a 43-year-old female who presents emerged department with ongoing sore throat.  Likely viral in nature based on history and physical exam.  COVID, influenza and RSV testing negative.  Patient had recent strep testing that was negative.  Mono testing negative.  Patient felt better after the aforementioned interventions.  There certainly may also be a component of acid reflux exacerbating her symptoms.  Advised her to initiate PPI treatment and follow-up with GI as needed.  Was also given phone number for ear nose and throat specialty care for which she can follow-up with as needed.    Diagnosis:    ICD-10-CM    1. Sore throat  J02.9         Scribe Disclosure:  Mike POLLACK, am serving as a scribe at 2:08 AM on 1/11/2024 to document services personally performed by Mary Ann  Agapito Ellison MD based on my observations and the provider's statements to me.     1/11/2024   Agapito Reese MD Battista, Christopher Joseph, MD  01/11/24 0642

## 2024-01-22 NOTE — ED NOTES
Bed: ED20  Expected date: 1/29/19  Expected time: 6:40 PM  Means of arrival:   Comments:  Hen 434   38 F   1 week postpartum, low back pain   
(E4) spontaneous

## 2024-01-31 ENCOUNTER — HOSPITAL ENCOUNTER (OUTPATIENT)
Dept: MAMMOGRAPHY | Facility: CLINIC | Age: 44
Discharge: HOME | End: 2024-01-31
Attending: ADVANCED PRACTICE MIDWIFE | Admitting: ADVANCED PRACTICE MIDWIFE
Payer: COMMERCIAL

## 2024-01-31 DIAGNOSIS — Z12.39 ENCOUNTER FOR SCREENING FOR MALIGNANT NEOPLASM OF BREAST, UNSPECIFIED SCREENING MODALITY: ICD-10-CM

## 2024-01-31 PROCEDURE — 77067 SCR MAMMO BI INCL CAD: CPT

## 2024-03-31 ENCOUNTER — HOSPITAL ENCOUNTER (EMERGENCY)
Facility: CLINIC | Age: 44
Discharge: HOME | End: 2024-03-31
Attending: EMERGENCY MEDICINE | Admitting: EMERGENCY MEDICINE
Payer: COMMERCIAL

## 2024-03-31 ENCOUNTER — APPOINTMENT (OUTPATIENT)
Dept: GENERAL RADIOLOGY | Facility: CLINIC | Age: 44
End: 2024-03-31
Attending: EMERGENCY MEDICINE
Payer: COMMERCIAL

## 2024-03-31 VITALS
SYSTOLIC BLOOD PRESSURE: 90 MMHG | OXYGEN SATURATION: 100 % | HEART RATE: 60 BPM | RESPIRATION RATE: 12 BRPM | TEMPERATURE: 98.4 F | DIASTOLIC BLOOD PRESSURE: 58 MMHG

## 2024-03-31 DIAGNOSIS — M62.838 TRAPEZIUS MUSCLE SPASM: ICD-10-CM

## 2024-03-31 DIAGNOSIS — R00.2 PALPITATIONS: ICD-10-CM

## 2024-03-31 LAB
ALBUMIN SERPL BCG-MCNC: 4.5 G/DL (ref 3.5–5.2)
ALP SERPL-CCNC: 83 U/L (ref 40–150)
ALT SERPL W P-5'-P-CCNC: 15 U/L (ref 0–50)
ANION GAP SERPL CALCULATED.3IONS-SCNC: 10 MMOL/L (ref 7–15)
AST SERPL W P-5'-P-CCNC: 20 U/L (ref 0–45)
BASOPHILS # BLD AUTO: 0.1 10E3/UL (ref 0–0.2)
BASOPHILS NFR BLD AUTO: 1 %
BILIRUB SERPL-MCNC: 0.2 MG/DL
BUN SERPL-MCNC: 5.9 MG/DL (ref 6–20)
CALCIUM SERPL-MCNC: 9.3 MG/DL (ref 8.6–10)
CHLORIDE SERPL-SCNC: 105 MMOL/L (ref 98–107)
CREAT SERPL-MCNC: 0.59 MG/DL (ref 0.51–0.95)
DEPRECATED HCO3 PLAS-SCNC: 24 MMOL/L (ref 22–29)
EGFRCR SERPLBLD CKD-EPI 2021: >90 ML/MIN/1.73M2
EOSINOPHIL # BLD AUTO: 0.2 10E3/UL (ref 0–0.7)
EOSINOPHIL NFR BLD AUTO: 3 %
ERYTHROCYTE [DISTWIDTH] IN BLOOD BY AUTOMATED COUNT: 13.8 % (ref 10–15)
GLUCOSE SERPL-MCNC: 98 MG/DL (ref 70–99)
HCG SERPL QL: NEGATIVE
HCT VFR BLD AUTO: 39.4 % (ref 35–47)
HGB BLD-MCNC: 12.2 G/DL (ref 11.7–15.7)
HOLD SPECIMEN: NORMAL
IMM GRANULOCYTES # BLD: 0 10E3/UL
IMM GRANULOCYTES NFR BLD: 0 %
LYMPHOCYTES # BLD AUTO: 2.5 10E3/UL (ref 0.8–5.3)
LYMPHOCYTES NFR BLD AUTO: 30 %
MAGNESIUM SERPL-MCNC: 2.2 MG/DL (ref 1.7–2.3)
MCH RBC QN AUTO: 26.6 PG (ref 26.5–33)
MCHC RBC AUTO-ENTMCNC: 31 G/DL (ref 31.5–36.5)
MCV RBC AUTO: 86 FL (ref 78–100)
MONOCYTES # BLD AUTO: 0.6 10E3/UL (ref 0–1.3)
MONOCYTES NFR BLD AUTO: 7 %
NEUTROPHILS # BLD AUTO: 5 10E3/UL (ref 1.6–8.3)
NEUTROPHILS NFR BLD AUTO: 59 %
NRBC # BLD AUTO: 0 10E3/UL
NRBC BLD AUTO-RTO: 0 /100
PLATELET # BLD AUTO: 234 10E3/UL (ref 150–450)
POTASSIUM SERPL-SCNC: 3.6 MMOL/L (ref 3.4–5.3)
PROT SERPL-MCNC: 7.1 G/DL (ref 6.4–8.3)
RBC # BLD AUTO: 4.59 10E6/UL (ref 3.8–5.2)
SODIUM SERPL-SCNC: 139 MMOL/L (ref 135–145)
TROPONIN T SERPL HS-MCNC: <6 NG/L
TSH SERPL DL<=0.005 MIU/L-ACNC: 1.46 UIU/ML (ref 0.3–4.2)
WBC # BLD AUTO: 8.4 10E3/UL (ref 4–11)

## 2024-03-31 PROCEDURE — 36415 COLL VENOUS BLD VENIPUNCTURE: CPT | Performed by: EMERGENCY MEDICINE

## 2024-03-31 PROCEDURE — 84484 ASSAY OF TROPONIN QUANT: CPT | Performed by: EMERGENCY MEDICINE

## 2024-03-31 PROCEDURE — 84443 ASSAY THYROID STIM HORMONE: CPT | Performed by: EMERGENCY MEDICINE

## 2024-03-31 PROCEDURE — 93005 ELECTROCARDIOGRAM TRACING: CPT | Performed by: EMERGENCY MEDICINE

## 2024-03-31 PROCEDURE — 72040 X-RAY EXAM NECK SPINE 2-3 VW: CPT

## 2024-03-31 PROCEDURE — 250N000013 HC RX MED GY IP 250 OP 250 PS 637: Performed by: EMERGENCY MEDICINE

## 2024-03-31 PROCEDURE — 80053 COMPREHEN METABOLIC PANEL: CPT | Performed by: EMERGENCY MEDICINE

## 2024-03-31 PROCEDURE — 71046 X-RAY EXAM CHEST 2 VIEWS: CPT

## 2024-03-31 PROCEDURE — 83735 ASSAY OF MAGNESIUM: CPT | Performed by: EMERGENCY MEDICINE

## 2024-03-31 PROCEDURE — 99285 EMERGENCY DEPT VISIT HI MDM: CPT | Mod: 25 | Performed by: EMERGENCY MEDICINE

## 2024-03-31 PROCEDURE — 84703 CHORIONIC GONADOTROPIN ASSAY: CPT | Performed by: EMERGENCY MEDICINE

## 2024-03-31 PROCEDURE — 85025 COMPLETE CBC W/AUTO DIFF WBC: CPT | Performed by: EMERGENCY MEDICINE

## 2024-03-31 PROCEDURE — 99284 EMERGENCY DEPT VISIT MOD MDM: CPT | Mod: 25 | Performed by: EMERGENCY MEDICINE

## 2024-03-31 PROCEDURE — 93010 ELECTROCARDIOGRAM REPORT: CPT | Performed by: EMERGENCY MEDICINE

## 2024-03-31 RX ORDER — METHOCARBAMOL 500 MG/1
1000 TABLET, FILM COATED ORAL ONCE
Status: COMPLETED | OUTPATIENT
Start: 2024-03-31 | End: 2024-03-31

## 2024-03-31 RX ORDER — FERROUS SULFATE 325(65) MG
1 TABLET ORAL
COMMUNITY
Start: 2024-01-26

## 2024-03-31 RX ORDER — OXYCODONE HYDROCHLORIDE 5 MG/1
5 TABLET ORAL ONCE
Status: DISCONTINUED | OUTPATIENT
Start: 2024-03-31 | End: 2024-03-31

## 2024-03-31 RX ORDER — TRAMADOL HYDROCHLORIDE 50 MG/1
50-100 TABLET ORAL EVERY 8 HOURS PRN
Qty: 16 TABLET | Refills: 0 | Status: SHIPPED | OUTPATIENT
Start: 2024-03-31

## 2024-03-31 RX ORDER — METHOCARBAMOL 500 MG/1
1000 TABLET, FILM COATED ORAL 3 TIMES DAILY
Qty: 30 TABLET | Refills: 0 | Status: SHIPPED | OUTPATIENT
Start: 2024-03-31 | End: 2024-04-05

## 2024-03-31 RX ORDER — ACETAMINOPHEN 500 MG
1000 TABLET ORAL ONCE
Status: COMPLETED | OUTPATIENT
Start: 2024-03-31 | End: 2024-03-31

## 2024-03-31 RX ADMIN — ACETAMINOPHEN 1000 MG: 500 TABLET ORAL at 17:54

## 2024-03-31 RX ADMIN — METHOCARBAMOL 1000 MG: 500 TABLET ORAL at 17:55

## 2024-03-31 ASSESSMENT — COLUMBIA-SUICIDE SEVERITY RATING SCALE - C-SSRS
6. HAVE YOU EVER DONE ANYTHING, STARTED TO DO ANYTHING, OR PREPARED TO DO ANYTHING TO END YOUR LIFE?: NO
2. HAVE YOU ACTUALLY HAD ANY THOUGHTS OF KILLING YOURSELF IN THE PAST MONTH?: NO
1. IN THE PAST MONTH, HAVE YOU WISHED YOU WERE DEAD OR WISHED YOU COULD GO TO SLEEP AND NOT WAKE UP?: NO

## 2024-03-31 ASSESSMENT — ACTIVITIES OF DAILY LIVING (ADL): ADLS_ACUITY_SCORE: 35

## 2024-03-31 NOTE — ED PROVIDER NOTES
[facility] - [place], [place] EMERGENCY DEPARTMENT ([facility])    3/31/24      ED PROVIDER NOTE   History     Chief Complaint   Patient presents with    Tachycardia     subjective    Shoulder Pain     X 2 months. R shoulder     HPI  Heydi Loza is a 43 year old female who has a history of previous anxiety, depression, and high risk pregnancy who presents via car to the ER for evaluation of some increasing right shoulder pain. Patient states that she has had some shoulder pain over the last 2 months that starts in her upper back and radiates down her right arm to her elbow. The patient states that this has been going on for approximately 2 months, but has been worse lately. Patient states that she also has had an episode of palpitations associated with chest pressure, some diaphoresis, and some vomiting. Patient states she had an episode like this today and has had one previous episode. During the previous episodes, she was evaluated with an EKG by her primary care, but has not had any kind of cardiac workup. Patient's cardiac risk factors include some borderline diabetes and no other risk factors. Patient states she has no palpitations or chest pain now.  Patient states the episode of palpitations with chest pressure lasted approximately 2 minutes earlier today    This part of the medical record was transcribed by Ashleigh Su, Medical Scribe, from a dictation done by Gaetano Malhotra MD.      PAST MEDICAL HISTORY:   Past Medical History:   Diagnosis Date    GBS (group B Streptococcus carrier), +RV culture, currently pregnant 8/10/2011    IUD -- Mirena 10/28/2011    10/10/13 Removed via hysteroscope in office w/o difficulty. Strings NOT visible extending from external cervical os.  US documenting intrauterine IUD obtained 12/20/2011 and 9/13/2013.       Menarche age 18    cycles 28-30 days x 5 days    Postpartum depression 9/28/2011    Sprain of ankle, unspecified site 12/12/2013    Varicosities      Hemorrhoids       PAST SURGICAL HISTORY:   Past Surgical History:   Procedure Laterality Date     SECTION  2011    Procedure: SECTION; Surgeon:ANDRES LU; Location:[facility] L+[facility]     SECTION N/A 2015    Procedure:  SECTION;  Surgeon: Shalonda Rosales MD;  Location: [facility] L+D     SECTION N/A 2019    Procedure: Repeat  Section;  Surgeon: Tiana Mcmahan MD;  Location: [facility] L+D       Past medical history, past surgical history, medications, and allergies were reviewed with the patient.     FAMILY HISTORY:   Family History   Problem Relation Age of Onset    Family History Negative No family hx of     Breast Cancer No family hx of     Ovarian Cancer No family hx of        SOCIAL HISTORY:   Social History     Tobacco Use    Smoking status: Never    Smokeless tobacco: Never   Substance Use Topics    Alcohol use: No     Alcohol/week: 0.0 standard drinks of alcohol     Social history was reviewed with the patient.    Patient's Medications          Previous Medications    AMOXICILLIN (AMOXIL) 500 MG CAPSULE    Take 2 capsules (1,000 mg) by mouth daily Your first dose was given 3/15/23. Please take your next dose 3/16/23.    IBUPROFEN (ADVIL/MOTRIN) 600 MG TABLET    Take 1 tablet (600 mg) by mouth every 6 hours as needed for pain    PRENATAL VIT-FE FUMARATE-FA (PRENATAL MULTIVITAMIN W/IRON) 27-0.8 MG TABLET    Take 1 tablet by mouth daily                        Allergies   Allergen Reactions    Naproxen      PN: LW Reaction: GI upset    Nkda [No Known Drug Allergy]        A complete review of systems was performed with pertinent positives and negatives noted in the HPI, and all other systems negative.    Physical Exam   BP: 116/66  Pulse: 72  Temp: 98.4  F (36.9  C)  Resp: 16  SpO2: 100 %      Physical Exam  Vitals and nursing note reviewed.   Constitutional:       Appearance: She is not ill-appearing or diaphoretic.   HENT:      Head: Atraumatic.    Eyes:      Extraocular Movements: Extraocular movements intact.      Pupils: Pupils are equal, round, and reactive to light.   Neck:      Comments: There is tenderness down into the right trapezius muscle  Cardiovascular:      Rate and Rhythm: Regular rhythm.   Pulmonary:      Breath sounds: Normal breath sounds.   Abdominal:      Palpations: Abdomen is soft.      Tenderness: There is no abdominal tenderness.   Musculoskeletal:         General: No deformity.      Cervical back: Neck supple. No tenderness.   Neurological:      General: No focal deficit present.      Mental Status: She is alert and oriented to person, place, and time.   Psychiatric:         Mood and Affect: Mood normal.         ED Course     Orders Placed This Encounter   Procedures    Chest XR,  PA & LAT    Cervical spine XR, 2-3 views    [place] Draw    Extra Blue Top Tube    Extra Red Top Tube    Extra Green Top (Lithium Heparin) Tube    Extra Purple Top Tube    Troponin T, High Sensitivity    Comprehensive metabolic panel    Magnesium    HCG qualitative pregnancy (blood)    CBC with platelets and differential    TSH    EKG 12 lead    Cardiac Continuous Monitoring    Peripheral IV: Standard    Review medications with patient    Oxygen: Nasal cannula, Oxygen mask    CBC with platelets differential     EKG revealed a normal sinus rhythm at a rate of 67 with a IN interval of 0.132 and a QRS duration of 0.078.  The patient had a leftward axis with no acute ST or T wave changes significant for ischemia.  This is read by me personally.     Procedures         Results for orders placed or performed during the hospital encounter of 03/31/24 (from the past 24 hour(s))   [place] Draw    Narrative    The following orders were created for panel order [place] Draw.  Procedure                               Abnormality         Status                     ---------                               -----------         ------                     Extra Blue Top  Tube[[id number]]                              Final result               Extra Red Top Tube[[id number]]                               Final result               Extra Green Top (Lithium...[[id number]]                      Final result               Extra Purple Top Tube[[id number]]                            Final result                 Please view results for these tests on the individual orders.   Extra Blue Top Tube   Result Value Ref Range    Hold Specimen JIC    Extra Red Top Tube   Result Value Ref Range    Hold Specimen JIC    Extra Green Top (Lithium Heparin) Tube   Result Value Ref Range    Hold Specimen JIC    Extra Purple Top Tube   Result Value Ref Range    Hold Specimen =    CBC with platelets differential    Narrative    The following orders were created for panel order CBC with platelets differential.  Procedure                               Abnormality         Status                     ---------                               -----------         ------                     CBC with platelets and d...[[id number]]  Abnormal            Final result                 Please view results for these tests on the individual orders.   Troponin T, High Sensitivity   Result Value Ref Range    Troponin T, High Sensitivity <6 <=14 ng/L   Comprehensive metabolic panel   Result Value Ref Range    Sodium 139 135 - 145 mmol/L    Potassium 3.6 3.4 - 5.3 mmol/L    Carbon Dioxide (CO2) 24 22 - 29 mmol/L    Anion Gap 10 7 - 15 mmol/L    Urea Nitrogen 5.9 (L) 6.0 - 20.0 mg/dL    Creatinine 0.59 0.51 - 0.95 mg/dL    GFR Estimate >90 >60 mL/min/1.73m2    Calcium 9.3 8.6 - 10.0 mg/dL    Chloride 105 98 - 107 mmol/L    Glucose 98 70 - 99 mg/dL    Alkaline Phosphatase 83 40 - 150 U/L    AST 20 0 - 45 U/L    ALT 15 0 - 50 U/L    Protein Total 7.1 6.4 - 8.3 g/dL    Albumin 4.5 3.5 - 5.2 g/dL    Bilirubin Total 0.2 <=1.2 mg/dL   Magnesium   Result Value Ref Range    Magnesium 2.2 1.7 - 2.3 mg/dL   HCG qualitative pregnancy (blood)    Result Value Ref Range    hCG Serum Qualitative Negative Negative   CBC with platelets and differential   Result Value Ref Range    WBC Count 8.4 4.0 - 11.0 10e3/uL    RBC Count 4.59 3.80 - 5.20 10e6/uL    Hemoglobin 12.2 11.7 - 15.7 g/dL    Hematocrit 39.4 35.0 - 47.0 %    MCV 86 78 - 100 fL    MCH 26.6 26.5 - 33.0 pg    MCHC 31.0 (L) 31.5 - 36.5 g/dL    RDW 13.8 10.0 - 15.0 %    Platelet Count 234 150 - 450 10e3/uL    % Neutrophils 59 %    % Lymphocytes 30 %    % Monocytes 7 %    % Eosinophils 3 %    % Basophils 1 %    % Immature Granulocytes 0 %    NRBCs per 100 WBC 0 <1 /100    Absolute Neutrophils 5.0 1.6 - 8.3 10e3/uL    Absolute Lymphocytes 2.5 0.8 - 5.3 10e3/uL    Absolute Monocytes 0.6 0.0 - 1.3 10e3/uL    Absolute Eosinophils 0.2 0.0 - 0.7 10e3/uL    Absolute Basophils 0.1 0.0 - 0.2 10e3/uL    Absolute Immature Granulocytes 0.0 <=0.4 10e3/uL    Absolute NRBCs 0.0 10e3/uL   Chest XR,  PA & LAT    Narrative    EXAM: XR CHEST 2 VIEWS  LOCATION: [facility]  DATE: 3/31/2024    INDICATION: Chest pain.  COMPARISON: 04/01/2023.      Impression    IMPRESSION: Negative chest.   Cervical spine XR, 2-3 views    Narrative    EXAM: XR CERVICAL SPINE 2/3 VIEWS  LOCATION: [facility]  DATE: 3/31/2024    INDICATION: pain  COMPARISON: None.      Impression    IMPRESSION: Straightening of the normal cervical lordosis. No significant listhesis. Vertebral body heights are maintained. No fracture. The base of the dens is intact. The lateral masses of C1 and C2 align appropriately. Normal discs and facets for age.   Normal pulmonary apices.             Critical care was not performed.     Medical Decision Making  The patient's presentation was of moderate complexity (an acute illness with systemic symptoms).    The patient's evaluation involved:  ordering and/or review of 3+ test(s) in this encounter (see above)    The patient's  management necessitated moderate risk (prescription drug management including medications given in the ED).     Assessments & Plan (with Medical Decision Making)     I have reviewed the nursing notes.    Patient is most concerned about her right shoulder pain which seems to be coming from the trapezius muscle on the right and has no evidence of a cervical nerve or radiculopathy involvement based on the patient's x-ray imaging.  Patient will be treated for this with the medications below.  Meanwhile the patient's episodes of palpitations #2 in the last several months and are brief without near syncopal symptoms.  An outpatient Zio patch monitor will be ordered.    I have reviewed the findings, diagnosis, plan and need for follow up with the patient.    New Prescriptions    METHOCARBAMOL (ROBAXIN) 500 MG TABLET    Take 2 tablets (1,000 mg) by mouth 3 times daily for 5 days    TRAMADOL (ULTRAM) 50 MG TABLET    Take 1-2 tablets ( mg) by mouth every 8 hours as needed for moderate to severe pain       Final diagnoses:   Trapezius muscle spasm - right; consider cervical radiculopathy   Palpitations     Fill your prescriptions and take as directed.    Do not lift more than 10 pounds with your right arm for the next week; work note given    Use heat to your shoulder for 30 minutes 3 times daily over the next 3 days.    A heart monitor will be mailed out to you with instructions on how to apply it.  Wear it for 7 days and then return it in the  provided for analysis.    Please make an appointment to follow up with Your Primary Care Provider in 10-14 days for recheck.      Gaetano Malhotra MD    3/31/2024   [facility] EMERGENCY DEPARTMENT     Torres Salter MD  03/31/24 [id number]

## 2024-03-31 NOTE — Clinical Note
Heydi Loza was seen and treated in our emergency department on 3/31/2024.  She may return to work on 04/01/2024.  Patient will be unable to lift more than 10 pounds with her right arm for 1 week     If you have any questions or concerns, please don't hesitate to call.      Torres Salter MD

## 2024-03-31 NOTE — DISCHARGE INSTRUCTIONS
Fill your prescriptions and take as directed.    Do not lift more than 10 pounds with your right arm for the next week    Use heat to your shoulder for 30 minutes 3 times daily over the next 3 days.    A heart monitor will be mailed out to you with instructions on how to apply it.  Wear it for 7 days and then return it in the  provided for analysis.    Please make an appointment to follow up with Your Primary Care Provider in 10-14 days for recheck.

## 2024-04-01 ENCOUNTER — ORDERS ONLY (AUTO-RELEASED) (OUTPATIENT)
Dept: EMERGENCY MEDICINE | Facility: CLINIC | Age: 44
End: 2024-04-01
Payer: COMMERCIAL

## 2024-04-01 DIAGNOSIS — R00.2 PALPITATIONS: ICD-10-CM

## 2024-04-27 LAB
ATRIAL RATE - MUSE: 65 BPM
DIASTOLIC BLOOD PRESSURE - MUSE: NORMAL MMHG
INTERPRETATION ECG - MUSE: NORMAL
P AXIS - MUSE: 26 DEGREES
PR INTERVAL - MUSE: 128 MS
QRS DURATION - MUSE: 84 MS
QT - MUSE: 416 MS
QTC - MUSE: 432 MS
R AXIS - MUSE: -9 DEGREES
SYSTOLIC BLOOD PRESSURE - MUSE: NORMAL MMHG
T AXIS - MUSE: 32 DEGREES
VENTRICULAR RATE- MUSE: 65 BPM

## 2024-11-04 ENCOUNTER — MEDICAL CORRESPONDENCE (OUTPATIENT)
Dept: HEALTH INFORMATION MANAGEMENT | Facility: CLINIC | Age: 44
End: 2024-11-04
Payer: COMMERCIAL

## 2024-11-07 ENCOUNTER — ANCILLARY PROCEDURE (OUTPATIENT)
Dept: GENERAL RADIOLOGY | Facility: CLINIC | Age: 44
End: 2024-11-07
Payer: COMMERCIAL

## 2024-11-07 DIAGNOSIS — M25.511 RIGHT SHOULDER PAIN: ICD-10-CM

## 2024-11-07 PROCEDURE — 73030 X-RAY EXAM OF SHOULDER: CPT | Mod: TC | Performed by: STUDENT IN AN ORGANIZED HEALTH CARE EDUCATION/TRAINING PROGRAM

## (undated) DEVICE — LINEN ORTHO PACK 5446

## (undated) DEVICE — LINEN GOWN X4 5410

## (undated) DEVICE — BASIN SET MAJOR

## (undated) DEVICE — SUCTION TIP POOLE K770

## (undated) DEVICE — CATH TRAY FOLEY SURESTEP 16FR WDRAIN BAG STLK LATEX A300316A

## (undated) DEVICE — WIPES FOLEY CARE SURESTEP PROVON DFC100

## (undated) DEVICE — ESU PENCIL W/SMOKE EVAC NEPTUNE STRYKER 0703-046-000

## (undated) DEVICE — TUBING SMOKE EVAC 1CMX3M SEA3710

## (undated) DEVICE — ESU GROUND PAD UNIVERSAL W/O CORD

## (undated) DEVICE — GLOVE PROTEXIS W/NEU-THERA 6.5  2D73TE65

## (undated) DEVICE — LINEN TOWEL PACK X5 5464

## (undated) DEVICE — ESU PENCIL W/HOLSTER E2350H

## (undated) DEVICE — PACK C-SECTION LF PL15OTA83B

## (undated) DEVICE — GLOVE PROTEXIS BLUE W/NEU-THERA 7.0  2D73EB70

## (undated) DEVICE — SU MONOCRYL 0 CTB-1 36" YB946

## (undated) DEVICE — PREP POVIDONE IODINE SCRUB 7.5% 120ML

## (undated) DEVICE — PREP POVIDONE IODINE SOLUTION 10% 120ML

## (undated) DEVICE — SOL NACL 0.9% IRRIG 1000ML BOTTLE 2F7124

## (undated) DEVICE — PREP CHLORAPREP 26ML TINTED ORANGE  260815

## (undated) DEVICE — SOL WATER IRRIG 1000ML BOTTLE 2F7114

## (undated) DEVICE — LIGHT HANDLE X2

## (undated) DEVICE — SU VICRYL 0 CT-1 36" J946H

## (undated) RX ORDER — KETOROLAC TROMETHAMINE 30 MG/ML
INJECTION, SOLUTION INTRAMUSCULAR; INTRAVENOUS
Status: DISPENSED
Start: 2019-01-22

## (undated) RX ORDER — FENTANYL CITRATE 50 UG/ML
INJECTION, SOLUTION INTRAMUSCULAR; INTRAVENOUS
Status: DISPENSED
Start: 2019-01-22

## (undated) RX ORDER — PHENYLEPHRINE HCL IN 0.9% NACL 1 MG/10 ML
SYRINGE (ML) INTRAVENOUS
Status: DISPENSED
Start: 2019-01-22

## (undated) RX ORDER — ONDANSETRON 2 MG/ML
INJECTION INTRAMUSCULAR; INTRAVENOUS
Status: DISPENSED
Start: 2019-01-22

## (undated) RX ORDER — EPHEDRINE SULFATE 50 MG/ML
INJECTION, SOLUTION INTRAMUSCULAR; INTRAVENOUS; SUBCUTANEOUS
Status: DISPENSED
Start: 2019-01-22

## (undated) RX ORDER — OXYTOCIN/0.9 % SODIUM CHLORIDE 30/500 ML
PLASTIC BAG, INJECTION (ML) INTRAVENOUS
Status: DISPENSED
Start: 2019-01-22